# Patient Record
Sex: FEMALE | Race: BLACK OR AFRICAN AMERICAN | Employment: PART TIME | URBAN - METROPOLITAN AREA
[De-identification: names, ages, dates, MRNs, and addresses within clinical notes are randomized per-mention and may not be internally consistent; named-entity substitution may affect disease eponyms.]

---

## 2017-05-16 ENCOUNTER — HOSPITAL ENCOUNTER (EMERGENCY)
Age: 52
Discharge: HOME | End: 2017-05-16
Attending: EMERGENCY MEDICINE | Admitting: EMERGENCY MEDICINE
Payer: SELF-PAY

## 2017-05-16 ENCOUNTER — APPOINTMENT (OUTPATIENT)
Dept: GENERAL RADIOLOGY | Age: 52
End: 2017-05-16
Attending: EMERGENCY MEDICINE
Payer: SELF-PAY

## 2017-05-16 VITALS
HEIGHT: 67 IN | HEART RATE: 81 BPM | SYSTOLIC BLOOD PRESSURE: 125 MMHG | DIASTOLIC BLOOD PRESSURE: 55 MMHG | WEIGHT: 240 LBS | OXYGEN SATURATION: 97 % | RESPIRATION RATE: 16 BRPM | BODY MASS INDEX: 37.67 KG/M2 | TEMPERATURE: 98.1 F

## 2017-05-16 DIAGNOSIS — L23.7 POISON IVY DERMATITIS: Primary | ICD-10-CM

## 2017-05-16 DIAGNOSIS — S60.221A CONTUSION OF RIGHT HAND, INITIAL ENCOUNTER: ICD-10-CM

## 2017-05-16 PROCEDURE — 99282 EMERGENCY DEPT VISIT SF MDM: CPT

## 2017-05-16 PROCEDURE — 73130 X-RAY EXAM OF HAND: CPT

## 2017-05-16 RX ORDER — TRAZODONE HYDROCHLORIDE 150 MG/1
150 TABLET ORAL
COMMUNITY
End: 2020-07-06

## 2017-05-16 RX ORDER — FLUOXETINE HYDROCHLORIDE 20 MG/1
20 CAPSULE ORAL DAILY
COMMUNITY

## 2017-05-16 RX ORDER — IBUPROFEN 600 MG/1
600 TABLET ORAL
Qty: 20 TAB | Refills: 0 | Status: SHIPPED | OUTPATIENT
Start: 2017-05-16 | End: 2019-08-01

## 2017-05-16 RX ORDER — CALAMINE/ZINC OXIDE
LOTION (ML) TOPICAL
Qty: 1 BOTTLE | Refills: 0 | Status: SHIPPED | OUTPATIENT
Start: 2017-05-16 | End: 2019-08-01

## 2017-05-16 RX ORDER — TRIAMCINOLONE ACETONIDE 1 MG/ML
LOTION TOPICAL 3 TIMES DAILY
Qty: 60 ML | Refills: 0 | Status: SHIPPED | OUTPATIENT
Start: 2017-05-16 | End: 2019-08-01

## 2017-05-16 NOTE — ED PROVIDER NOTES
Patient is a 46 y.o. female presenting with hand pain and poison ivy. The history is provided by the patient. Hand Pain    This is a new (Rt ahnd pain after getting it jammed between a couch and a wall 2 weeks ago.) problem. The current episode started more than 1 week ago. The problem occurs constantly. The problem has not changed since onset. The pain is present in the right hand. The pain is at a severity of 7/10. Associated symptoms include itching. Pertinent negatives include no numbness, full range of motion, no stiffness, no tingling, no back pain and no neck pain. She has tried nothing for the symptoms. There has been a history of trauma. Poison Ivy/Poison Oak/Poison Sumac Exposure    This is a new problem. The current episode started more than 2 days ago. The problem has been gradually worsening. There has been no fever. The rash is present on the neck, back, left arm, right arm and right hand. The patient is experiencing no pain. Associated symptoms include itching. She has tried anti-itch cream for the symptoms. The treatment provided mild relief. Past Medical History:   Diagnosis Date    Arthritis     Tobacco abuse 10/22/2014       Past Surgical History:   Procedure Laterality Date    HX CHOLECYSTECTOMY  1992-[facility]         Family History:   Problem Relation Age of Onset    Hypertension Mother     Alcohol abuse Father        Social History     Social History    Marital status: SINGLE     Spouse name: N/A    Number of children: N/A    Years of education: N/A     Occupational History    Not on file. Social History Main Topics    Smoking status: Current Some Day Smoker    Smokeless tobacco: Never Used    Alcohol use No    Drug use: No    Sexual activity: Yes     Partners: Male     Birth control/ protection: None     Other Topics Concern    Not on file     Social History Narrative    10/22/14 Lives with boyfriend. Has GED. Reads well.   Not working, used to do cooking/car /HVAC. ALLERGIES: Review of patient's allergies indicates no known allergies. Review of Systems   Constitutional: Negative. Negative for activity change, chills, fatigue and fever. HENT: Negative. Negative for trouble swallowing. Eyes: Negative. Negative for pain. Respiratory: Negative. Negative for cough, chest tightness, shortness of breath and wheezing. Cardiovascular: Negative. Negative for chest pain, palpitations and leg swelling. Gastrointestinal: Negative. Negative for abdominal pain, diarrhea, nausea and vomiting. Genitourinary: Negative. Negative for difficulty urinating and dysuria. Musculoskeletal: Positive for arthralgias. Negative for back pain, joint swelling, neck pain and stiffness. Skin: Positive for itching and rash. Negative for wound. Neurological: Negative. Negative for tingling, numbness and headaches. Psychiatric/Behavioral: Negative. Vitals:    05/16/17 1200   BP: 125/55   Pulse: 81   Resp: 16   Temp: 98.1 °F (36.7 °C)   SpO2: 97%   Weight: 108.9 kg (240 lb)   Height: 5' 7\" (1.702 m)            Physical Exam   Constitutional: She is oriented to person, place, and time. She appears well-developed and well-nourished. No distress. HENT:   Head: Normocephalic and atraumatic. Right Ear: External ear normal.   Left Ear: External ear normal.   Nose: Nose normal.   Mouth/Throat: Oropharynx is clear and moist. No oropharyngeal exudate. Eyes: Conjunctivae and EOM are normal. Pupils are equal, round, and reactive to light. Neck: Normal range of motion. Neck supple. Cardiovascular: Normal rate, regular rhythm, normal heart sounds and intact distal pulses. Pulmonary/Chest: Effort normal and breath sounds normal.   Abdominal: Soft. Bowel sounds are normal. There is no tenderness. Musculoskeletal: Normal range of motion. She exhibits tenderness. She exhibits no edema or deformity.         Right wrist: Normal.        Right hand: She exhibits tenderness. She exhibits normal range of motion, no bony tenderness, normal two-point discrimination, normal capillary refill, no deformity, no laceration and no swelling. Normal sensation noted. Normal strength noted. Left hand: Normal.   Neurological: She is alert and oriented to person, place, and time. No cranial nerve deficit. Skin: Skin is warm and dry. Rash (generalized macular and nodular rash dispersed over bilateral hands, arms, lower back, and neck. Claamine lotion applied.) noted. She is not diaphoretic. Psychiatric: She has a normal mood and affect. Her behavior is normal. Judgment and thought content normal.   Nursing note and vitals reviewed. MDM  Number of Diagnoses or Management Options  Contusion of right hand, initial encounter:   Poison ivy dermatitis:   Diagnosis management comments: DDx: irritant contact derm, allergic contact derm, eczema, tinea  Sprain, contusion, fx, dislocation, arthritis    LABORATORY TESTS:  No results found for this or any previous visit (from the past 12 hour(s)). IMAGING RESULTS:  XR HAND RT MIN 3 V   Final Result       MEDICATIONS GIVEN:  Medications - No data to display    IMPRESSION:  Poison ivy dermatitis  (primary encounter diagnosis)  Contusion of right hand, initial encounter    PLAN:  1. Current Discharge Medication List    START taking these medications    ibuprofen (MOTRIN) 600 mg tablet  Take 1 Tab by mouth every six (6) hours as needed for Pain. Qty: 20 Tab Refills: 0    calamine-zinc oxide (CALAMINE) topical lotion  Apply  to affected area two (2) times daily as needed for Itching. Qty: 1 Bottle Refills: 0    triamcinolone (KENALOG) 0.1 % lotion  Apply  to affected area three (3) times daily. use thin layer  Qty: 60 mL Refills: 0      CONTINUE these medications which have NOT CHANGED    FLUoxetine (PROZAC) 20 mg capsule  Take 20 mg by mouth daily.     traZODone (DESYREL) 150 mg tablet  Take 150 mg by mouth nightly. hydrOXYzine (VISTARIL) 100 mg capsule  Take 1 Cap by mouth three (3) times daily as needed for Itching. Qty: 20 Cap Refills: 0    triamcinolone (ARISTOCORT) 0.5 % topical cream  Apply  to affected area two (2) times a day. Use thin layer. Do not use until Oral Prednisone has been finished. Qty: 15 g Refills: 0        2. Follow-up Information     Follow up With Details Comments [address], [place] Schedule an appointment as soon as possible for a   visit in 1 week As needed, If symptoms worsen [address] [place] [address]  [phone]        Return to ED if worse                  Amount and/or Complexity of Data Reviewed  Tests in the radiology section of CPT®: ordered and reviewed    Patient Progress  Patient progress: stable    ED Course       Procedures    1:11 PM  I have discussed with patient their diagnosis, treatment, and follow up plan. The patient agrees to follow up as outlined in discharge paperwork and also to return to the ED with any worsening.  Kenn Saha PA-C

## 2017-05-16 NOTE — ED NOTES
Emergency Department Nursing Plan of Care       The Nursing Plan of Care is developed from the Nursing assessment and Emergency Department Attending provider initial evaluation. The plan of care may be reviewed in the ED Provider note. The Plan of Care was developed with the following considerations:   Patient / Family readiness to learn indicated by:verbalized understanding  Persons(s) to be included in education: patient  Barriers to Learning/Limitations:No    Signed     Harl Bottom    5/16/2017   12:25 PM    See nursing assessment    Patient is alert and oriented x 4 and in no acute distress at this time. Respirations are at a regular rate, depth, and pattern. Patient updated on plan of care and has no questions or concerns at this time.

## 2017-05-16 NOTE — DISCHARGE INSTRUCTIONS
Poison [place], [place], and Sumac: Care Instructions  Your Care Instructions    Poison ivy, poison oak, and poison sumac are plants that can cause a skin rash upon contact. The red, itchy rash often shows up in lines or streaks and may cause fluid-filled blisters or large, raised hives. The rash is caused by an allergic reaction to an oil in poison ivy, oak, and sumac. The rash may occur when you touch the plant or when you touch clothing, pet fur, sporting gear, gardening tools, or other objects that have come in contact with one of these plants. You cannot catch or spread the rash, even if you touch it or the blister fluid, because the plant oil will already have been absorbed or washed off the skin. The rash may seem to be spreading, but either it is still developing from earlier contact or you have touched something that still has the plant oil on it. Follow-up care is a key part of your treatment and safety. Be sure to make and go to all appointments, and call your doctor if you are having problems. It's also a good idea to know your test results and keep a list of the medicines you take. How can you care for yourself at home? · If your doctor prescribed a cream, use it as directed. If your doctor prescribed medicine, take it exactly as prescribed. Call your doctor if you think you are having a problem with your medicine. · Use cold, wet cloths to reduce itching. · Keep cool, and stay out of the sun. · Leave the rash open to the air. · Wash all clothing or other things that may have come in contact with the plant oil. · Avoid most lotions and ointments until the rash heals. Calamine lotion may help relieve symptoms of a plant rash. Use it 3 or 4 times a day. To prevent poison ivy exposure  If you know that you will be near poison ivy, oak, or sumac, you can try these options:  · Use a product designed to help prevent plant oil from getting on the skin.  These products, such as Ivy X Pre-Contact Skin Solution, come in lotions, sprays, or towelettes. You put the product on your skin right before you go outdoors. · If you did not use a preventive product and you have had contact with plant oil, clean it off your skin as soon as possible. Use a product such as [employer]ec[employer] Original Outdoor Skin Cleanser. These products can also be used to clean plant oil from clothing or tools. When should you call for help? Call your doctor now or seek immediate medical care if:  · Your rash gets worse, and you start to feel bad and have a fever, a stiff neck, nausea, and vomiting. · You have signs of infection, such as:  ¨ Increased pain, swelling, warmth, or redness. ¨ Red streaks leading from the rash. ¨ Pus draining from the rash. ¨ A fever. Watch closely for changes in your health, and be sure to contact your doctor if:  · You have new blisters or bruises, or the rash spreads and looks like a sunburn. · The rash gets worse, or it comes back after nearly disappearing. · You think a medicine you are using is making your rash worse. · Your rash does not clear up after 1 to 2 weeks of home treatment. · You have joint aches or body aches with your rash. Where can you learn more? Go to http://willian-linus.info/. Enter M728 in the search box to learn more about \"Poison [employer], [place], and [place]ac: Care Instructions. \"  Current as of: October 13, 2016  Content Version: 11.2  © 2829-6698 [employer]. Care instructions adapted under license by [employer] (which disclaims liability or warranty for this information). If you have questions about a medical condition or this instruction, always ask your healthcare professional. [place] [address] any warranty or liability for your use of this information. Hand Bruises: Care Instructions  Your Care Instructions  Bruises, or contusions, can happen as a result of an impact or fall.  Most people think of a bruise as a black-and-blue spot. This happens when small blood vessels get torn and leak blood under the skin. The bruise may turn purplish black, reddish blue, or yellowish green as it heals. But bones and muscles can also get bruised. This may damage the hand but not cause a bruise that you can see. Most bruises aren't serious and will go away on their own in 2 to 4 weeks. But sometimes a more serious hand injury might not heal on its own. Tell your doctor if you have new symptoms or your injury is not getting better over time. You may have tests to see if you have bone or nerve damage. These tests may include X-rays, a CT scan, or an MRI. If you damaged bones or muscles, you may need more treatment. The doctor has checked you carefully, but problems can develop later. If you notice any problems or new symptoms, get medical treatment right away. Follow-up care is a key part of your treatment and safety. Be sure to make and go to all appointments, and call your doctor if you are having problems. It's also a good idea to know your test results and keep a list of the medicines you take. How can you care for yourself at home? · Put ice or a cold pack on the hand for 10 to 20 minutes at a time. Put a thin cloth between the ice and your skin. · Prop up your hand on a pillow when you ice it or anytime you sit or lie down during the next 3 days. Try to keep your hand above the level of your heart. This will help reduce swelling. · Be safe with medicines. Read and follow all instructions on the label. ¨ If the doctor gave you a prescription medicine for pain, take it as prescribed. ¨ If you are not taking a prescription pain medicine, ask your doctor if you can take an over-the-counter medicine. · Be sure to follow your doctor's advice about moving and exercising your injured hand. When should you call for help? Call your doctor now or seek immediate medical care if:  · Your pain gets worse.   · You have new or worse swelling. · You have tingling, weakness, or numbness in the area near the bruise. · The area near the bruise is cold or pale. · You have symptoms of infection, such as:  ¨ Increased pain, swelling, warmth, or redness. ¨ Red streaks leading from the area. ¨ Pus draining from the area. ¨ A fever. Watch closely for changes in your health, and be sure to contact your doctor if:  · You do not get better as expected. Where can you learn more? Go to http://willian-linus.info/. Enter X898 in the search box to learn more about \"Hand Bruises: Care Instructions. \"  Current as of: May 27, 2016  Content Version: 11.2  © 9733-3080 [employer]. Care instructions adapted under license by [employer] (which disclaims liability or warranty for this information). If you have questions about a medical condition or this instruction, always ask your healthcare professional. [place] [address] any warranty or liability for your use of this information.

## 2019-08-01 ENCOUNTER — HOSPITAL ENCOUNTER (EMERGENCY)
Age: 54
Discharge: HOME | End: 2019-08-01
Attending: EMERGENCY MEDICINE
Payer: MEDICAID

## 2019-08-01 VITALS
OXYGEN SATURATION: 95 % | SYSTOLIC BLOOD PRESSURE: 123 MMHG | BODY MASS INDEX: 37.03 KG/M2 | RESPIRATION RATE: 16 BRPM | TEMPERATURE: 98.9 F | HEIGHT: 68 IN | DIASTOLIC BLOOD PRESSURE: 95 MMHG | HEART RATE: 96 BPM

## 2019-08-01 DIAGNOSIS — L23.7 ALLERGIC CONTACT DERMATITIS DUE TO PLANTS, EXCEPT FOOD: Primary | ICD-10-CM

## 2019-08-01 PROCEDURE — 96372 THER/PROPH/DIAG INJ SC/IM: CPT

## 2019-08-01 PROCEDURE — 99283 EMERGENCY DEPT VISIT LOW MDM: CPT

## 2019-08-01 PROCEDURE — 74011250636 HC RX REV CODE- 250/636: Performed by: NURSE PRACTITIONER

## 2019-08-01 PROCEDURE — 74011250637 HC RX REV CODE- 250/637: Performed by: NURSE PRACTITIONER

## 2019-08-01 RX ORDER — FAMOTIDINE 40 MG/1
40 TABLET, FILM COATED ORAL DAILY
Qty: 5 TAB | Refills: 0 | Status: SHIPPED | OUTPATIENT
Start: 2019-08-01 | End: 2019-08-06

## 2019-08-01 RX ORDER — FAMOTIDINE 20 MG/1
40 TABLET, FILM COATED ORAL
Status: COMPLETED | OUTPATIENT
Start: 2019-08-01 | End: 2019-08-01

## 2019-08-01 RX ORDER — CETIRIZINE HCL 10 MG
10 TABLET ORAL DAILY
Qty: 5 TAB | Refills: 0 | Status: SHIPPED | OUTPATIENT
Start: 2019-08-01 | End: 2019-08-06

## 2019-08-01 RX ORDER — DIPHENHYDRAMINE HCL 25 MG
25 CAPSULE ORAL
Status: COMPLETED | OUTPATIENT
Start: 2019-08-01 | End: 2019-08-01

## 2019-08-01 RX ORDER — DEXAMETHASONE SODIUM PHOSPHATE 100 MG/10ML
10 INJECTION INTRAMUSCULAR; INTRAVENOUS ONCE
Status: COMPLETED | OUTPATIENT
Start: 2019-08-01 | End: 2019-08-01

## 2019-08-01 RX ORDER — PREDNISONE 20 MG/1
20 TABLET ORAL DAILY
Qty: 5 TAB | Refills: 0 | Status: SHIPPED | OUTPATIENT
Start: 2019-08-01 | End: 2019-08-06

## 2019-08-01 RX ADMIN — FAMOTIDINE 40 MG: 20 TABLET ORAL at 20:15

## 2019-08-01 RX ADMIN — DEXAMETHASONE SODIUM PHOSPHATE 10 MG: 10 INJECTION INTRAMUSCULAR; INTRAVENOUS at 20:13

## 2019-08-01 RX ADMIN — DIPHENHYDRAMINE HYDROCHLORIDE 25 MG: 25 CAPSULE ORAL at 20:15

## 2019-08-01 NOTE — LETTER
[facility] EMERGENCY DEPT 
[address] [address] 
[phone] Work/School Note Date: 8/1/2019 To Whom It May concern: 
 
Dolly Mcdowell was seen and treated today in the emergency room by the following provider(s): 
Attending Provider: Sabrina Romano MD 
Nurse Practitioner: Tim Boateng NP. Dolly Mcdowell may return to work on 8/4/19. Sincerely, Trudy Vasquez NP

## 2019-08-01 NOTE — ED TRIAGE NOTES
Pt reports itching and hives X yesterday after doing yard work. Pt is unknown what caused her reaction. Pt denies SOB or throat closing.

## 2019-08-01 NOTE — ED PROVIDER NOTES
EMERGENCY DEPARTMENT HISTORY AND PHYSICAL EXAM    Date: 8/1/2019  Patient Name: Kishore Iglesias    History of Presenting Illness     Chief Complaint   Patient presents with    Allergic Reaction         History Provided By: Patient    HPI: Kishore Iglesias is a 52 y.o. female with a PMH of No significant past medical history who presents with allergic reaction. Pt is a . States she was in the plants yesterday. Unsure if any were poison ivy. Initially started with rash to arms but has spread to chest, neck, and abdomen. Has not tried anything for sx. States today the itching was too intense and she came to the ER. Denies CP, SOB, throat scratching or itching. PCP: Ana Gupta MD    Current Outpatient Medications   Medication Sig Dispense Refill    famotidine (PEPCID) 40 mg tablet Take 1 Tab by mouth daily for 5 doses. 5 Tab 0    predniSONE (DELTASONE) 20 mg tablet Take 20 mg by mouth daily for 5 days. With Breakfast 5 Tab 0    cetirizine (ZYRTEC) 10 mg tablet Take 1 Tab by mouth daily for 5 days. 5 Tab 0    FLUoxetine (PROZAC) 20 mg capsule Take 20 mg by mouth daily.  traZODone (DESYREL) 150 mg tablet Take 150 mg by mouth nightly. Past History     Past Medical History:  Past Medical History:   Diagnosis Date    Arthritis     Tobacco abuse 10/22/2014       Past Surgical History:  Past Surgical History:   Procedure Laterality Date    HX CHOLECYSTECTOMY  1992-[facility]       Family History:  Family History   Problem Relation Age of Onset    Hypertension Mother     Alcohol abuse Father        Social History:  Social History     Tobacco Use    Smoking status: Current Some Day Smoker    Smokeless tobacco: Never Used   Substance Use Topics    Alcohol use: No    Drug use: No       Allergies: Allergies   Allergen Reactions    Bee Venom Protein (Honey Bee) Shortness of Breath         Review of Systems   Review of Systems   Constitutional: Negative for chills and fever.    HENT: Negative for congestion and rhinorrhea. Eyes: Negative for pain and itching. Respiratory: Negative for cough and shortness of breath. Cardiovascular: Negative for palpitations and leg swelling. Gastrointestinal: Negative for abdominal pain, nausea and vomiting. Musculoskeletal: Negative for arthralgias and joint swelling. Skin: Positive for rash. Negative for color change. No drainage, redness, swelling, warmth   Neurological: Negative for headaches. All other systems reviewed and are negative. Physical Exam     Vitals:    08/01/19 1950   BP: (!) 123/95   Pulse: 96   Resp: 16   Temp: 98.9 °F (37.2 °C)   SpO2: 95%   Height: 5' 7.5\" (1.715 m)     Physical Exam   Constitutional: She is oriented to person, place, and time. She appears well-developed and well-nourished. HENT:   Head: Normocephalic and atraumatic. Mouth/Throat: No uvula swelling. No oropharyngeal exudate, posterior oropharyngeal edema or posterior oropharyngeal erythema. Eyes: Pupils are equal, round, and reactive to light. Conjunctivae and EOM are normal.   Neck: Normal range of motion. Neck supple. Cardiovascular: Normal rate, regular rhythm and normal heart sounds. Pulmonary/Chest: Effort normal and breath sounds normal.   Neurological: She is alert and oriented to person, place, and time. Skin: Rash noted. Rash is papular (diffuse to BUE neck, chest and abd ). Nursing note and vitals reviewed. Diagnostic Study Results     Labs -   No results found for this or any previous visit (from the past 12 hour(s)). Radiologic Studies -   No orders to display     CT Results  (Last 48 hours)    None        CXR Results  (Last 48 hours)    None            Medical Decision Making   I am the first provider for this patient. I reviewed the vital signs, available nursing notes, past medical history, past surgical history, family history and social history.     Vital Signs-Reviewed the patient's vital signs. Records Reviewed: Nursing Notes and Old Medical Records            Disposition:  Discharge     DISCHARGE NOTE:   8:17 PM      Care plan outlined and precautions discussed. Patient has no new complaints, changes, or physical findings. All medications were reviewed with the patient; will d/c home with zyrtec, prednisone and pepcid. All of pt's questions and concerns were addressed. Patient was instructed and agrees to follow up with PCp, as well as to return to the ED upon further deterioration. Patient is ready to go home. Follow-up Information     Follow up With Specialties Details Why Contact Info    Sayra Huang MD Internal Medicine, Internal Medicine Call If symptoms worsen [address] [facility]  [phone]            Current Discharge Medication List      START taking these medications    Details   famotidine (PEPCID) 40 mg tablet Take 1 Tab by mouth daily for 5 doses. Qty: 5 Tab, Refills: 0      predniSONE (DELTASONE) 20 mg tablet Take 20 mg by mouth daily for 5 days. With Breakfast  Qty: 5 Tab, Refills: 0      cetirizine (ZYRTEC) 10 mg tablet Take 1 Tab by mouth daily for 5 days. Qty: 5 Tab, Refills: 0         CONTINUE these medications which have NOT CHANGED    Details   FLUoxetine (PROZAC) 20 mg capsule Take 20 mg by mouth daily. traZODone (DESYREL) 150 mg tablet Take 150 mg by mouth nightly. STOP taking these medications       ibuprofen (MOTRIN) 600 mg tablet Comments:   Reason for Stopping:         calamine-zinc oxide (CALAMINE) topical lotion Comments:   Reason for Stopping:         hydrOXYzine (VISTARIL) 100 mg capsule Comments:   Reason for Stopping:               Provider Notes (Medical Decision Making):   DDX: contact dermatitis, poison ivy, eczema     Procedures:  Procedures    Please note that this dictation was completed with Dragon, computer voice recognition software.   Quite often unanticipated grammatical, syntax, homophones, and other interpretive errors are inadvertently transcribed by the computer software. Please disregard these errors. Additionally, please excuse any errors that have escaped final proofreading. Diagnosis     Clinical Impression:   1.  Allergic contact dermatitis due to plants, except food

## 2019-08-02 NOTE — DISCHARGE INSTRUCTIONS
Patient Education        Poison ABBEY-JASMINN, [place], and [place]: Care Instructions  Your Care Instructions    Poison ivy, poison oak, and poison sumac are plants that can cause a skin rash upon contact. The red, itchy rash often shows up in lines or streaks and may cause fluid-filled blisters or large, raised hives. The rash is caused by an allergic reaction to an oil in poison ivy, oak, and sumac. The rash may occur when you touch the plant or when you touch clothing, pet fur, sporting gear, gardening tools, or other objects that have come in contact with one of these plants. You cannot catch or spread the rash, even if you touch it or the blister fluid, because the plant oil will already have been absorbed or washed off the skin. The rash may seem to be spreading, but either it is still developing from earlier contact or you have touched something that still has the plant oil on it. Follow-up care is a key part of your treatment and safety. Be sure to make and go to all appointments, and call your doctor if you are having problems. It's also a good idea to know your test results and keep a list of the medicines you take. How can you care for yourself at home? · If your doctor prescribed a cream, use it as directed. If your doctor prescribed medicine, take it exactly as prescribed. Call your doctor if you think you are having a problem with your medicine. · Use cold, wet cloths to reduce itching. · Keep cool, and stay out of the sun. · Leave the rash open to the air. · Wash all clothing or other things that may have come in contact with the plant oil. · Avoid most lotions and ointments until the rash heals. Calamine lotion may help relieve symptoms of a plant rash. Use it 3 or 4 times a day. To prevent poison ivy exposure  If you know that you will be near poison ivy, oak, or sumac, you can try these options:  · Use a product designed to help prevent plant oil from getting on the skin.  These products, such as Ivy X Pre-Contact Skin Solution, come in lotions, sprays, or towelettes. You put the product on your skin right before you go outdoors. · If you did not use a preventive product and you have had contact with plant oil, clean it off your skin as soon as possible. Use a product such as Tecnu Original Outdoor Skin Cleanser. These products can also be used to clean plant oil from clothing or tools. When should you call for help? Call your doctor now or seek immediate medical care if:    · Your rash gets worse, and you start to feel bad and have a fever, a stiff neck, nausea, and vomiting.     · You have signs of infection, such as:  ? Increased pain, swelling, warmth, or redness. ? Red streaks leading from the rash. ? Pus draining from the rash. ? A fever.    Watch closely for changes in your health, and be sure to contact your doctor if:    · You have new blisters or bruises, or the rash spreads and looks like a sunburn.     · The rash gets worse, or it comes back after nearly disappearing.     · You think a medicine you are using is making your rash worse.     · Your rash does not clear up after 1 to 2 weeks of home treatment.     · You have joint aches or body aches with your rash. Where can you learn more? Go to http://willian-linus.info/. Enter W492 in the search box to learn more about \"Poison ABBEY-EMILY, [place], and [place]: Care Instructions. \"  Current as of: April 1, 2019  Content Version: 12.1  © 9703-2105 Healthwise, Incorporated. Care instructions adapted under license by [employer] (which disclaims liability or warranty for this information). If you have questions about a medical condition or this instruction, always ask your healthcare professional. [address] any warranty or liability for your use of this information.

## 2019-08-02 NOTE — ED NOTES
Discharge instructions were given to the patient by [address], RN. The patient left the Emergency Department ambulatory, alert and oriented and in no acute distress with 3 prescriptions. The patient was encouraged to call or return to the ED for worsening issues or problems and was encouraged to schedule a follow up appointment for continuing care. The patient verbalized understanding of discharge instructions and prescriptions, all questions were answered. The patient has no further concerns at this time.

## 2019-08-02 NOTE — ED NOTES
Pt presents ambulatory to ED complaining of rash (bumps), redness to neck, BUE and anterior trunk after doing yard work yesterday. Pt reports taking benadryl, Cortizone cream, and ibuprofen with no relief of symtoms. Pt is alert and oriented x 4, RR even and unlabored, skin is warm and dry. Assesment completed and pt updated on plan of care. Emergency Department Nursing Plan of Care       The Nursing Plan of Care is developed from the Nursing assessment and Emergency Department Attending provider initial evaluation. The plan of care may be reviewed in the ED Provider note.     The Plan of Care was developed with the following considerations:   Patient / Family readiness to learn indicated by:verbalized understanding  Persons(s) to be included in education: patient  Barriers to Learning/Limitations:No    Signed     [address], RN    8/1/2019   8:03 PM

## 2019-08-12 ENCOUNTER — HOSPITAL ENCOUNTER (EMERGENCY)
Age: 54
Discharge: HOME | End: 2019-08-12
Attending: EMERGENCY MEDICINE
Payer: MEDICAID

## 2019-08-12 VITALS
RESPIRATION RATE: 16 BRPM | TEMPERATURE: 97.9 F | SYSTOLIC BLOOD PRESSURE: 135 MMHG | HEART RATE: 82 BPM | OXYGEN SATURATION: 96 % | DIASTOLIC BLOOD PRESSURE: 76 MMHG | HEIGHT: 67 IN | WEIGHT: 225 LBS | BODY MASS INDEX: 35.31 KG/M2

## 2019-08-12 DIAGNOSIS — B37.31 VAGINAL CANDIDIASIS: Primary | ICD-10-CM

## 2019-08-12 DIAGNOSIS — E11.65 UNCONTROLLED TYPE 2 DIABETES MELLITUS WITH HYPERGLYCEMIA (HCC): ICD-10-CM

## 2019-08-12 LAB
APPEARANCE UR: CLEAR
BACTERIA URNS QL MICRO: NEGATIVE /HPF
BILIRUB UR QL: NEGATIVE
CLUE CELLS VAG QL WET PREP: NORMAL
COLOR UR: ABNORMAL
EPITH CASTS URNS QL MICRO: ABNORMAL /LPF
GLUCOSE BLD STRIP.AUTO-MCNC: 341 MG/DL (ref 65–100)
GLUCOSE UR STRIP.AUTO-MCNC: >1000 MG/DL
HGB UR QL STRIP: NEGATIVE
KETONES UR QL STRIP.AUTO: NEGATIVE MG/DL
KOH PREP SPEC: NORMAL
LEUKOCYTE ESTERASE UR QL STRIP.AUTO: NEGATIVE
NITRITE UR QL STRIP.AUTO: NEGATIVE
PH UR STRIP: 5.5 [PH] (ref 5–8)
PROT UR STRIP-MCNC: NEGATIVE MG/DL
RBC #/AREA URNS HPF: ABNORMAL /HPF (ref 0–5)
SERVICE CMNT-IMP: ABNORMAL
SERVICE CMNT-IMP: NORMAL
SP GR UR REFRACTOMETRY: 1.01 (ref 1–1.03)
T VAGINALIS VAG QL WET PREP: NORMAL
UA: UC IF INDICATED,UAUC: ABNORMAL
UROBILINOGEN UR QL STRIP.AUTO: 0.2 EU/DL (ref 0.2–1)
WBC URNS QL MICRO: ABNORMAL /HPF (ref 0–4)
YEAST URNS QL MICRO: PRESENT

## 2019-08-12 PROCEDURE — 87210 SMEAR WET MOUNT SALINE/INK: CPT

## 2019-08-12 PROCEDURE — 99283 EMERGENCY DEPT VISIT LOW MDM: CPT

## 2019-08-12 PROCEDURE — 81001 URINALYSIS AUTO W/SCOPE: CPT

## 2019-08-12 PROCEDURE — 82962 GLUCOSE BLOOD TEST: CPT

## 2019-08-12 PROCEDURE — 87491 CHLMYD TRACH DNA AMP PROBE: CPT

## 2019-08-12 PROCEDURE — 74011250637 HC RX REV CODE- 250/637: Performed by: PHYSICIAN ASSISTANT

## 2019-08-12 RX ORDER — FLUCONAZOLE 150 MG/1
150 TABLET ORAL
Qty: 1 TAB | Refills: 0 | Status: SHIPPED | OUTPATIENT
Start: 2019-08-12 | End: 2019-08-12

## 2019-08-12 RX ORDER — HYDROXYZINE 50 MG/1
50 TABLET, FILM COATED ORAL
Qty: 20 TAB | Refills: 0 | Status: SHIPPED | OUTPATIENT
Start: 2019-08-12 | End: 2019-08-22

## 2019-08-12 RX ORDER — HYDROXYZINE 25 MG/1
50 TABLET, FILM COATED ORAL
Status: COMPLETED | OUTPATIENT
Start: 2019-08-12 | End: 2019-08-12

## 2019-08-12 RX ORDER — FLUCONAZOLE 100 MG/1
150 TABLET ORAL
Status: DISCONTINUED | OUTPATIENT
Start: 2019-08-12 | End: 2019-08-12

## 2019-08-12 RX ADMIN — HYDROXYZINE HYDROCHLORIDE 50 MG: 25 TABLET, FILM COATED ORAL at 18:24

## 2019-08-12 NOTE — DISCHARGE INSTRUCTIONS
Patient Education        Learning About High Blood Sugar  What is high blood sugar? Your body turns the food you eat into glucose (sugar), which it uses for energy. But if your body isn't able to use the sugar right away, it can build up in your blood and lead to high blood sugar. When the amount of sugar in your blood stays too high for too much of the time, you may have diabetes. Diabetes is a disease that can cause serious health problems. The good news is that lifestyle changes may help you get your blood sugar back to normal and avoid or delay diabetes. What causes high blood sugar? Sugar (glucose) can build up in your blood if you:  · Are overweight. · Have a family history of diabetes. · Take certain medicines, such as steroids. What are the symptoms? Having high blood sugar may not cause any symptoms at all. Or it may make you feel very thirsty or very hungry. You may also urinate more often than usual, have blurry vision, or lose weight without trying. How is high blood sugar treated? You can take steps to lower your blood sugar level if you understand what makes it get higher. Your doctor may want you to learn how to test your blood sugar level at home. Then you can see how illness, stress, or different kinds of food or medicine raise or lower your blood sugar level. Other tests may be needed to see if you have diabetes. How can you prevent high blood sugar? · Watch your weight. If you're overweight, losing just a small amount of weight may help. Reducing fat around your waist is most important. · Limit the amount of calories, sweets, and unhealthy fat you eat. Ask your doctor if a dietitian can help you. A registered dietitian can help you create meal plans that fit your lifestyle. · Get at least 30 minutes of exercise on most days of the week. Exercise helps control your blood sugar. It also helps you maintain a healthy weight. Walking is a good choice.  You also may want to do other activities, such as running, swimming, cycling, or playing tennis or team sports. · If your doctor prescribed medicines, take them exactly as prescribed. Call your doctor if you think you are having a problem with your medicine. You will get more details on the specific medicines your doctor prescribes. Follow-up care is a key part of your treatment and safety. Be sure to make and go to all appointments, and call your doctor if you are having problems. It's also a good idea to know your test results and keep a list of the medicines you take. Where can you learn more? Go to http://willian-linus.info/. Enter O108 in the search box to learn more about \"Learning About High Blood Sugar. \"  Current as of: July 25, 2018  Content Version: 12.1  © 5582-6026 Healthwise, Incorporated. Care instructions adapted under license by [employer] (which disclaims liability or warranty for this information). If you have questions about a medical condition or this instruction, always ask your healthcare professional. [address] any warranty or liability for your use of this information.

## 2019-08-12 NOTE — ED TRIAGE NOTES
Reports seen here recently for skin rash (poison ivy/oak) still with itch and rash. Also reports has started with vaginal itching.

## 2019-08-12 NOTE — ED NOTES
Karen Monroe at bedside reviewing patient's discharge instructions and reviewing medications. Patient ambulatory home . Patient in no apparent distress.

## 2019-08-12 NOTE — ED NOTES
Pt arrived to ED with c/o itching x being exposed to poison ivy/oak and vag itching x 3 days. Pt is in no acute distress. Will continue to monitor. See nursing assessment. Safety precautions in place; call light within reach. Emergency Department Nursing Plan of Care       The Nursing Plan of Care is developed from the Nursing assessment and Emergency Department Attending provider initial evaluation. The plan of care may be reviewed in the ED Provider note.     The Plan of Care was developed with the following considerations:   Patient / Family readiness to learn indicated by:verbalized understanding  Persons(s) to be included in education: patient  Barriers to Learning/Limitations:No    Signed     Aleah Mcmahon RN    8/12/2019   6:39 PM

## 2019-08-12 NOTE — ED PROVIDER NOTES
EMERGENCY DEPARTMENT HISTORY AND PHYSICAL EXAM      Date: 8/12/2019  Patient Name: Linda Rick    History of Presenting Illness     Chief Complaint   Patient presents with    Skin Problem    Vaginal Itching       History Provided By: Patient    HPI: Linda Rick, 52 y.o. female with PMHx significant for arthritis, tobacco abuse, cholecsytectomy, presents ambulatory to the ED with cc of subacute moderate generalized pruritis x 12 days secondary to exposure to possible poisonous plant on 7/31/2019. Patient was seen and evaluated in [facility] ED on 8/1/2019 for same symptoms. Prescribed cetirizine, famotidine, prednisone with minimal relief. States she ran out of her prescriptions has not taken any medicine today. Additionally endorses she been having vaginal irritation, itching, discharge the last couple of days. No medications or modifying factors for the symptoms. Denies any recent antibiotic use. Endorses mild dysuria. Denies frequency, urgency, genital sore/ rash, cuts patient, diarrhea, abdominal pain, fever, chills, nausea, vomiting. There are no other complaints, changes, or physical findings at this time. PCP: Sayra Huang MD    No current facility-administered medications on file prior to encounter. Current Outpatient Medications on File Prior to Encounter   Medication Sig Dispense Refill    metformin HCl (METFORMIN PO) Take  by mouth.  FLUoxetine (PROZAC) 20 mg capsule Take 20 mg by mouth daily.  traZODone (DESYREL) 150 mg tablet Take 150 mg by mouth nightly.          Past History     Past Medical History:  Past Medical History:   Diagnosis Date    Arthritis     Diabetes ([address]     Tobacco abuse 10/22/2014       Past Surgical History:  Past Surgical History:   Procedure Laterality Date    HX CHOLECYSTECTOMY  1992-[facility]       Family History:  Family History   Problem Relation Age of Onset    Hypertension Mother     Alcohol abuse Father Social History:  Social History     Tobacco Use    Smoking status: Current Some Day Smoker    Smokeless tobacco: Never Used   Substance Use Topics    Alcohol use: No    Drug use: No       Allergies: Allergies   Allergen Reactions    Bee Venom Protein (Honey Bee) Shortness of Breath         Review of Systems   Review of Systems   Constitutional: Negative. Negative for activity change, chills, fatigue and fever. HENT: Negative. Eyes: Negative. Negative for pain. Respiratory: Negative. Negative for cough and shortness of breath. Cardiovascular: Negative. Negative for chest pain. Gastrointestinal: Negative. Negative for abdominal pain, diarrhea, nausea and vomiting. Genitourinary: Positive for dysuria and vaginal discharge. Negative for decreased urine volume, difficulty urinating, flank pain, frequency, genital sores, hematuria, pelvic pain, urgency and vaginal bleeding. Musculoskeletal: Negative. Negative for arthralgias and joint swelling. Skin: Positive for rash. Negative for wound. Neurological: Negative. Negative for headaches. Psychiatric/Behavioral: Negative. Physical Exam   Physical Exam   Constitutional: She is oriented to person, place, and time. She appears well-developed and well-nourished. No distress. HENT:   Head: Normocephalic and atraumatic. Right Ear: Hearing and external ear normal.   Left Ear: Hearing and external ear normal.   Nose: Nose normal.   Eyes: Pupils are equal, round, and reactive to light. Conjunctivae and EOM are normal.   Neck: Normal range of motion. Pulmonary/Chest: Effort normal. No respiratory distress. Musculoskeletal: Normal range of motion. Neurological: She is alert and oriented to person, place, and time. Skin: Skin is warm, dry and intact. No rash noted. She is not diaphoretic. Psychiatric: She has a normal mood and affect.  Her behavior is normal. Judgment and thought content normal.   Nursing note and vitals reviewed. Diagnostic Study Results     Labs -     Recent Results (from the past 12 hour(s))   WET PREP    Collection Time: 08/12/19  6:01 PM   Result Value Ref Range    Clue cells CLUE CELLS ABSENT      Wet prep NO TRICHOMONAS SEEN     KOH, OTHER SOURCES    Collection Time: 08/12/19  6:01 PM   Result Value Ref Range    Special Requests: NO SPECIAL REQUESTS      KOH FEW  YEAST       URINALYSIS W/ REFLEX CULTURE    Collection Time: 08/12/19  6:01 PM   Result Value Ref Range    Color YELLOW/STRAW      Appearance CLEAR CLEAR      Specific gravity 1.015 1.003 - 1.030      pH (UA) 5.5 5.0 - 8.0      Protein NEGATIVE  NEG mg/dL    Glucose >1,000 (A) NEG mg/dL    Ketone NEGATIVE  NEG mg/dL    Bilirubin NEGATIVE  NEG      Blood NEGATIVE  NEG      Urobilinogen 0.2 0.2 - 1.0 EU/dL    Nitrites NEGATIVE  NEG      Leukocyte Esterase NEGATIVE  NEG      WBC 0-4 0 - 4 /hpf    RBC 0-5 0 - 5 /hpf    Epithelial cells FEW FEW /lpf    Bacteria NEGATIVE  NEG /hpf    UA:UC IF INDICATED CULTURE NOT INDICATED BY UA RESULT CNI      Yeast PRESENT (A) NEG     GLUCOSE, POC    Collection Time: 08/12/19  6:34 PM   Result Value Ref Range    Glucose (POC) 341 (H) 65 - 100 mg/dL    Performed by Trice BRYAN        Radiologic Studies -   No orders to display     CT Results  (Last 48 hours)    None        CXR Results  (Last 48 hours)    None            Medical Decision Making   I am the first provider for this patient. I reviewed the vital signs, available nursing notes, past medical history, past surgical history, family history and social history. Vital Signs-Reviewed the patient's vital signs.   Patient Vitals for the past 12 hrs:   Temp Pulse Resp BP SpO2   08/12/19 1728 97.9 °F (36.6 °C) 82 16 135/76 96 %       Pulse Oximetry Analysis - 96% on RA    Records Reviewed: Nursing Notes, Old Medical Records, Previous Radiology Studies and Previous Laboratory Studies    Provider Notes (Medical Decision Making):   Patient presents with vaginal pruritis and discharge. DDx: Yeast infection, Chlamydia, gonorrhea, trichomonas, herpes, BV, UTI. Will get GC, UA, vaginal swabs. ED Course:   Initial assessment performed. The patients presenting problems have been discussed, and they are in agreement with the care plan formulated and outlined with them. I have encouraged them to ask questions as they arise throughout their visit. Educated patient extensively on blood glucose monitoring at home as well as taking medications as prescribed. Advised patient to follow-up with PCP within 1 week's time. Critical Care Time:   0    Disposition:  6:47 PM  I have discussed with patient their diagnosis, treatment, and follow up plan. The patient agrees to follow up as outlined in discharge paperwork and also to return to the ED with any worsening. Jane Cardona PA-C        PLAN:  1. Current Discharge Medication List      START taking these medications    Details   hydrOXYzine HCl (ATARAX) 50 mg tablet Take 1 Tab by mouth every six (6) hours as needed for Itching for up to 10 days. Qty: 20 Tab, Refills: 0      fluconazole (DIFLUCAN) 150 mg tablet Take 1 Tab by mouth now for 1 dose. FDA advises cautious prescribing of oral fluconazole in pregnancy. Qty: 1 Tab, Refills: 0         CONTINUE these medications which have NOT CHANGED    Details   metformin HCl (METFORMIN PO) Take  by mouth. FLUoxetine (PROZAC) 20 mg capsule Take 20 mg by mouth daily. traZODone (DESYREL) 150 mg tablet Take 150 mg by mouth nightly. 2.   Follow-up Information     Follow up With Specialties Details Why Contact Info    Sam De Jesus MD Internal Medicine, Internal Medicine Schedule an appointment as soon as possible for a visit in 3 days As needed [id number] [facility]  [phone]          Return to ED if worse     Diagnosis     Clinical Impression:   1. Vaginal candidiasis    2.  Uncontrolled type 2 diabetes mellitus with hyperglycemia ([facility] [address] Attestations:    Please note that this dictation was completed with Dragon, computer voice recognition software. Quite often unanticipated grammatical, syntax, homophones, and other interpretive errors are inadvertently transcribed by the computer software. Please disregard these errors. Additionally, please excuse any errors that have escaped final proofreading.

## 2019-08-14 LAB
C TRACH DNA SPEC QL NAA+PROBE: NEGATIVE
N GONORRHOEA DNA SPEC QL NAA+PROBE: NEGATIVE
SAMPLE TYPE: NORMAL
SERVICE CMNT-IMP: NORMAL
SPECIMEN SOURCE: NORMAL

## 2019-09-01 ENCOUNTER — HOSPITAL ENCOUNTER (EMERGENCY)
Age: 54
Discharge: HOME | End: 2019-09-01
Attending: EMERGENCY MEDICINE
Payer: MEDICAID

## 2019-09-01 VITALS
HEART RATE: 70 BPM | TEMPERATURE: 97.9 F | SYSTOLIC BLOOD PRESSURE: 142 MMHG | OXYGEN SATURATION: 97 % | HEIGHT: 67 IN | WEIGHT: 228 LBS | DIASTOLIC BLOOD PRESSURE: 57 MMHG | RESPIRATION RATE: 17 BRPM | BODY MASS INDEX: 35.79 KG/M2

## 2019-09-01 DIAGNOSIS — R10.2 SUPRAPUBIC ABDOMINAL PAIN: Primary | ICD-10-CM

## 2019-09-01 LAB
APPEARANCE UR: CLEAR
BACTERIA URNS QL MICRO: NEGATIVE /HPF
BILIRUB UR QL: NEGATIVE
CLUE CELLS VAG QL WET PREP: NORMAL
COLOR UR: ABNORMAL
EPITH CASTS URNS QL MICRO: ABNORMAL /LPF
GLUCOSE UR STRIP.AUTO-MCNC: >1000 MG/DL
HCG UR QL: NEGATIVE
HGB UR QL STRIP: NEGATIVE
KETONES UR QL STRIP.AUTO: NEGATIVE MG/DL
KOH PREP SPEC: NORMAL
LEUKOCYTE ESTERASE UR QL STRIP.AUTO: NEGATIVE
NITRITE UR QL STRIP.AUTO: NEGATIVE
PH UR STRIP: 7 [PH] (ref 5–8)
PROT UR STRIP-MCNC: NEGATIVE MG/DL
RBC #/AREA URNS HPF: ABNORMAL /HPF (ref 0–5)
SERVICE CMNT-IMP: NORMAL
SP GR UR REFRACTOMETRY: 1.03 (ref 1–1.03)
T VAGINALIS VAG QL WET PREP: NORMAL
UA: UC IF INDICATED,UAUC: ABNORMAL
UROBILINOGEN UR QL STRIP.AUTO: 0.2 EU/DL (ref 0.2–1)
WBC URNS QL MICRO: ABNORMAL /HPF (ref 0–4)

## 2019-09-01 PROCEDURE — 74011250637 HC RX REV CODE- 250/637: Performed by: PHYSICIAN ASSISTANT

## 2019-09-01 PROCEDURE — 96372 THER/PROPH/DIAG INJ SC/IM: CPT

## 2019-09-01 PROCEDURE — 87210 SMEAR WET MOUNT SALINE/INK: CPT

## 2019-09-01 PROCEDURE — 74011250636 HC RX REV CODE- 250/636: Performed by: PHYSICIAN ASSISTANT

## 2019-09-01 PROCEDURE — 81025 URINE PREGNANCY TEST: CPT

## 2019-09-01 PROCEDURE — 99282 EMERGENCY DEPT VISIT SF MDM: CPT

## 2019-09-01 PROCEDURE — 81001 URINALYSIS AUTO W/SCOPE: CPT

## 2019-09-01 PROCEDURE — 87491 CHLMYD TRACH DNA AMP PROBE: CPT

## 2019-09-01 RX ORDER — AZITHROMYCIN 500 MG/1
1000 TABLET, FILM COATED ORAL
Status: COMPLETED | OUTPATIENT
Start: 2019-09-01 | End: 2019-09-01

## 2019-09-01 RX ADMIN — LIDOCAINE HYDROCHLORIDE 250 MG: 10 INJECTION, SOLUTION EPIDURAL; INFILTRATION; INTRACAUDAL; PERINEURAL at 12:48

## 2019-09-01 RX ADMIN — AZITHROMYCIN 1000 MG: 500 TABLET, FILM COATED ORAL at 12:48

## 2019-09-01 NOTE — ED PROVIDER NOTES
EMERGENCY DEPARTMENT HISTORY AND PHYSICAL EXAM      Date: 9/1/2019  Patient Name: Diana Hannon    History of Presenting Illness     Chief Complaint   Patient presents with    Abdominal Pain    Exposure to STD     pt reported she was dating and had unprotected sex x 1 week and she has abdominal pain,vaginal discharge,concerned to check STD. History Provided By: Patient    HPI: Diana Hannon, 52 y.o. female PMHx significant for arthritis, DM presents ambulatory to the ED with cc of intermittent cramping suprapubic abd pain x 1 week with assoc increase in white vaginal discharge and vaginal irritation. Pt states she had unprotected intercourse recently and would like to be tested for STDs. Denies aggravating or alleviating sx. Rates pain 8/10. Pt has not taken anything for sx. There are no other complaints, changes, or physical findings at this time. PCP: Kathrine Quevedo MD    No current facility-administered medications on file prior to encounter. Current Outpatient Medications on File Prior to Encounter   Medication Sig Dispense Refill    metformin HCl (METFORMIN PO) Take  by mouth.  FLUoxetine (PROZAC) 20 mg capsule Take 20 mg by mouth daily.  traZODone (DESYREL) 150 mg tablet Take 150 mg by mouth nightly. Past History     Past Medical History:  Past Medical History:   Diagnosis Date    Arthritis     Diabetes ([address]     Tobacco abuse 10/22/2014       Past Surgical History:  Past Surgical History:   Procedure Laterality Date    HX CHOLECYSTECTOMY  1992-[facility]       Family History:  Family History   Problem Relation Age of Onset    Hypertension Mother     Alcohol abuse Father        Social History:  Social History     Tobacco Use    Smoking status: Current Some Day Smoker    Smokeless tobacco: Never Used   Substance Use Topics    Alcohol use: No    Drug use: No       Allergies:   Allergies   Allergen Reactions    Bee Venom Protein (Honey Bee) Shortness of Breath         Review of Systems   Review of Systems   Constitutional: Negative for chills and fever. Respiratory: Negative for shortness of breath. Cardiovascular: Negative for chest pain. Gastrointestinal: Positive for abdominal pain. Negative for nausea and vomiting. Genitourinary: Positive for vaginal discharge. Negative for dysuria, flank pain, frequency, genital sores, vaginal bleeding and vaginal pain. Musculoskeletal: Negative for back pain and myalgias. Skin: Negative for color change, pallor, rash and wound. Neurological: Negative for dizziness, weakness and light-headedness. All other systems reviewed and are negative. Physical Exam   Physical Exam   Constitutional: She is oriented to person, place, and time. She appears well-developed and well-nourished. No distress. HENT:   Head: Normocephalic and atraumatic. Eyes: Conjunctivae are normal.   Cardiovascular: Normal rate, regular rhythm and normal heart sounds. Pulmonary/Chest: Effort normal and breath sounds normal. No respiratory distress. Abdominal: Soft. Bowel sounds are normal. She exhibits no distension. Abdomen soft, nontender   Musculoskeletal: Normal range of motion. Neurological: She is alert and oriented to person, place, and time. Skin: Skin is warm. No rash noted. Psychiatric: She has a normal mood and affect. Her behavior is normal.   Nursing note and vitals reviewed.       Diagnostic Study Results     Labs -     Recent Results (from the past 12 hour(s))   MARGARITA, OTHER SOURCES    Collection Time: 09/01/19 12:48 PM   Result Value Ref Range    Special Requests: NO SPECIAL REQUESTS      KOH NO YEAST SEEN     WET PREP    Collection Time: 09/01/19 12:48 PM   Result Value Ref Range    Clue cells CLUE CELLS ABSENT      Wet prep NO TRICHOMONAS SEEN     URINALYSIS W/ REFLEX CULTURE    Collection Time: 09/01/19  1:44 PM   Result Value Ref Range    Color YELLOW/STRAW      Appearance CLEAR CLEAR      Specific gravity 1.030 1.003 - 1.030      pH (UA) 7.0 5.0 - 8.0      Protein NEGATIVE  NEG mg/dL    Glucose >1,000 (A) NEG mg/dL    Ketone NEGATIVE  NEG mg/dL    Bilirubin NEGATIVE  NEG      Blood NEGATIVE  NEG      Urobilinogen 0.2 0.2 - 1.0 EU/dL    Nitrites NEGATIVE  NEG      Leukocyte Esterase NEGATIVE  NEG      WBC 0-4 0 - 4 /hpf    RBC 0-5 0 - 5 /hpf    Epithelial cells FEW FEW /lpf    Bacteria NEGATIVE  NEG /hpf    UA:UC IF INDICATED CULTURE NOT INDICATED BY UA RESULT CNI     HCG URINE, QL. - POC    Collection Time: 09/01/19  1:46 PM   Result Value Ref Range    Pregnancy test,urine (POC) NEGATIVE  NEG         Radiologic Studies -   No orders to display     CT Results  (Last 48 hours)    None        CXR Results  (Last 48 hours)    None          Medical Decision Making   I am the first provider for this patient. I reviewed the vital signs, available nursing notes, past medical history, past surgical history, family history and social history. Vital Signs-Reviewed the patient's vital signs. Patient Vitals for the past 12 hrs:   Temp Pulse Resp BP SpO2   09/01/19 1200 97.9 °F (36.6 °C) 70 17 142/57 97 %         Records Reviewed: Nursing Notes and Old Medical Records    Provider Notes (Medical Decision Making):   DDx: Gonorrhea, Chlamydia, Trich, UTI, BV, Yeast      ED Course:   Initial assessment performed. The patients presenting problems have been discussed, and they are in agreement with the care plan formulated and outlined with them. I have encouraged them to ask questions as they arise throughout their visit. ED Course as of Sep 01 1518   Sun Sep 01, 2019   1430 Pt states he needs to take a walk, and never returned. [ET]      ED Course User Index  [ET] ZACHARY Valdivia         Disposition:   Pt eloped prior to labs resulting. PLAN:  1. Current Discharge Medication List        2. Follow-up Information    None       Return to ED if worse     Diagnosis     Clinical Impression:   1.  Suprapubic abdominal pain        Attestations:    ZACHARY Bay    Please note that this dictation was completed with [employer], the computer voice recognition software. Quite often unanticipated grammatical, syntax, homophones, and other interpretive errors are inadvertently transcribed by the computer software. Please disregard these errors. Please excuse any errors that have escaped final proofreading. Thank you.

## 2019-09-01 NOTE — ED NOTES
Patient here with c/o abdominal/pelvic discomfort and concern for STD. Patient reports symptoms started after some recent unprotected sex. Patient denies fevers. Patient reports burning when she urinates. Emergency Department Nursing Plan of Care       The Nursing Plan of Care is developed from the Nursing assessment and Emergency Department Attending provider initial evaluation. The plan of care may be reviewed in the ED Provider note.     The Plan of Care was developed with the following considerations:   Patient / Family readiness to learn indicated by:verbalized understanding  Persons(s) to be included in education: patient  Barriers to Learning/Limitations:no    Signed     Earlene London RN    9/1/2019   12:30 PM

## 2019-09-10 ENCOUNTER — OFFICE VISIT (OUTPATIENT)
Dept: INTERNAL MEDICINE CLINIC | Age: 54
End: 2019-09-10

## 2019-09-10 VITALS
WEIGHT: 223.1 LBS | OXYGEN SATURATION: 95 % | DIASTOLIC BLOOD PRESSURE: 73 MMHG | RESPIRATION RATE: 18 BRPM | HEIGHT: 67 IN | HEART RATE: 91 BPM | TEMPERATURE: 97.7 F | SYSTOLIC BLOOD PRESSURE: 128 MMHG | BODY MASS INDEX: 35.02 KG/M2

## 2019-09-10 DIAGNOSIS — L73.9 FOLLICULITIS: ICD-10-CM

## 2019-09-10 DIAGNOSIS — G89.29 CHRONIC PAIN OF BOTH KNEES: ICD-10-CM

## 2019-09-10 DIAGNOSIS — Z12.11 SCREENING FOR COLON CANCER: ICD-10-CM

## 2019-09-10 DIAGNOSIS — F41.8 DEPRESSION WITH ANXIETY: ICD-10-CM

## 2019-09-10 DIAGNOSIS — Z11.59 SCREENING FOR VIRAL DISEASE: ICD-10-CM

## 2019-09-10 DIAGNOSIS — M25.561 CHRONIC PAIN OF BOTH KNEES: ICD-10-CM

## 2019-09-10 DIAGNOSIS — E11.9 CONTROLLED TYPE 2 DIABETES MELLITUS WITHOUT COMPLICATION, WITHOUT LONG-TERM CURRENT USE OF INSULIN (HCC): ICD-10-CM

## 2019-09-10 DIAGNOSIS — I83.93 VARICOSE VEINS OF BOTH LOWER EXTREMITIES, UNSPECIFIED WHETHER COMPLICATED: ICD-10-CM

## 2019-09-10 DIAGNOSIS — M25.562 CHRONIC PAIN OF BOTH KNEES: ICD-10-CM

## 2019-09-10 DIAGNOSIS — Z12.31 SCREENING MAMMOGRAM, ENCOUNTER FOR: ICD-10-CM

## 2019-09-10 DIAGNOSIS — Z76.89 ESTABLISHING CARE WITH NEW DOCTOR, ENCOUNTER FOR: Primary | ICD-10-CM

## 2019-09-10 DIAGNOSIS — Z11.3 SCREENING EXAMINATION FOR STD (SEXUALLY TRANSMITTED DISEASE): ICD-10-CM

## 2019-09-10 DIAGNOSIS — N89.8 VAGINAL IRRITATION: ICD-10-CM

## 2019-09-10 LAB
ALBUMIN UR QL STRIP: 30 MG/L
CREATININE, URINE POC: 300 MG/DL
MICROALBUMIN/CREAT RATIO POC: <30 MG/G

## 2019-09-10 RX ORDER — HYDROXYZINE PAMOATE 50 MG/1
25 CAPSULE ORAL
COMMUNITY
End: 2020-07-06

## 2019-09-10 RX ORDER — MICONAZOLE NITRATE 2 %
1 CREAM WITH APPLICATOR VAGINAL
Qty: 45 G | Refills: 0 | Status: SHIPPED | OUTPATIENT
Start: 2019-09-10 | End: 2020-07-06

## 2019-09-10 RX ORDER — TRAMADOL HYDROCHLORIDE 50 MG/1
50 TABLET ORAL
COMMUNITY
End: 2020-07-06

## 2019-09-10 RX ORDER — METFORMIN HYDROCHLORIDE 1000 MG/1
1000 TABLET ORAL 2 TIMES DAILY WITH MEALS
Qty: 180 TAB | Refills: 0 | Status: SHIPPED | OUTPATIENT
Start: 2019-09-10

## 2019-09-10 RX ORDER — ASPIRIN 81 MG/1
TABLET ORAL DAILY
COMMUNITY

## 2019-09-10 RX ORDER — DICLOFENAC SODIUM 10 MG/G
GEL TOPICAL 4 TIMES DAILY
Qty: 100 G | Refills: 3 | Status: SHIPPED | OUTPATIENT
Start: 2019-09-10

## 2019-09-10 NOTE — PROGRESS NOTES
Subjective: (As above and below)     Chief Complaint   Patient presents with   Jak Carry Establish Care    Knee Pain     both    Hernia (Non Specific)    Vaginal Itching     Pt states she may have yeast infection and she has a small mass on top of groin     Leanor Sprinkles. Tereso Forbes is a 52y.o. year old female who presents to [facility] and multiple concerns: Will address primary concerns today   She is followed by [facility] for psychiatric care, has an appt today    Knee pain: bilateral ain \"for a few years\", had xrays done at [facility] [place] years ago. [facility] gave her tramadol and lortab. She has not done PT (declines ,however bc she doesn't want to disrupt her new job schedule at risk termination -She works part time at [employer].) she also refuses the possibility of joint injections. .. She states that ibup has caused GI irritation    Vaginal irritation: reports vaginal itching, all tests negative in the ED last week, scant white discharge. Feels itching on the outside. Believes that her partner may have been to rougch    Hernia: approx 4 years ago she was doing a lot of lifting and states that she developed an umbilical hernia - she had imaging at [facility] - she states that the bulge her abdomen has grown, it is mildly uncomfortable. Pap:due  Mammo: due  Colonoscopy: never done    Smokin/2 PPD - cut down from 1 PPD    Bump to her groin: after shaving she noticed a mildly tender bump to her pubic area - non-draining    Diabetic Review of Systems - reports taking metformin - believes last labs 1 year ago. Diet is fair. Eye exam: due  ACE/ARB: states she takes lisinopril sometimes -does not know dose  STATIN: not taking    Reviewed PmHx, RxHx, FmHx, SocHx, AllgHx and updated in chart.   Family History   Problem Relation Age of Onset    Hypertension Mother     Alcohol abuse Father        Past Medical History:   Diagnosis Date    Anxiety 2019    Arthritis     Depression     Diabetes ([facility] [address]     Tobacco abuse 10/22/2014      Social History     Socioeconomic History    Marital status: SINGLE     Spouse name: Not on file    Number of children: Not on file    Years of education: Not on file    Highest education level: Not on file   Tobacco Use    Smoking status: Current Some Day Smoker    Smokeless tobacco: Never Used   Substance and Sexual Activity    Alcohol use: No    Drug use: No    Sexual activity: Yes     Partners: Male     Birth control/protection: None   Social History Narrative    10/22/14 Lives with boyfriend. Has GED. Reads well. Not working, used to do [employer]. Current Outpatient Medications   Medication Sig    traMADol (ULTRAM) 50 mg tablet Take 50 mg by mouth every six (6) hours as needed for Pain.  hydrOXYzine pamoate (VISTARIL) 50 mg capsule Take 25 mg by mouth three (3) times daily as needed for Itching.  aspirin delayed-release 81 mg tablet Take  by mouth daily.  metformin HCl (METFORMIN PO) Take  by mouth.  FLUoxetine (PROZAC) 20 mg capsule Take 20 mg by mouth daily.  traZODone (DESYREL) 150 mg tablet Take 150 mg by mouth nightly. No current facility-administered medications for this visit. Review of Systems:   Constitutional:    Negative for fever and chills, negative diaphoresis. HEENT:              Negative for neck pain and stiffness. Eyes:                  Negative for visual disturbance, itching, redness or discharge. Respiratory:        Negative for cough and shortness of breath. Cardiovascular:  Negative for chest pain and palpitations. Gastrointestinal: Negative for nausea, vomiting, abdominal pain, diarrhea or constipation. +abd bulge  Genitourinary:     Negative for dysuria and frequency. Musculoskeletal: bilat knee pain  Skin:                    Negative for rash, masses or lesions. +bump to groin  Neurological:       Negative for dizzyness, seizure, loss of consciousness, weakness and numbness.      Objective:     Vitals:    09/10/19 0809 BP: 128/73   Pulse: 91   Resp: 18   Temp: 97.7 °F (36.5 °C)   TempSrc: Oral   SpO2: 95%   Weight: 223 lb 1.6 oz (101.2 kg)   Height: 5' 7\" (1.702 m)       Results for orders placed or performed in visit on 09/10/19   AMB POC URINE, MICROALBUMIN, SEMIQUANT (3 RESULTS)   Result Value Ref Range    ALBUMIN, URINE POC 30 Negative mg/L    CREATININE, URINE  mg/dL    Microalbumin/creat ratio (POC) <30 <30 MG/G         Physical Examination: General appearance - alert, well appearing, and in no distress  Mental status - alert, oriented to person, place, and time  Ears - bilateral TM's and external ear canals normal  Chest - clear to auscultation, no wheezes, rales or rhonchi, symmetric air entry  Heart - normal rate, regular rhythm, normal S1, S2, no murmurs, rubs, clicks or gallops  Abdomen - soft, nontender, nondistended, no masses or organomegaly  HERNIA EXAM: umbilical hernia, nontender  Neurological - alert, oriented, normal speech, no focal findings or movement disorder noted  Musculoskeletal -bilateral knees: no redness/warmth/swellig. Pain to posterior aspect. No crepitus  Extremities - non-tender varicose veins noted bilat  Skin - pubis: small raised slightly tender bump - w/ central punctum - non-draining      Assessment/ Plan: Will get imaging from [facility] regarding hernia, can then refer to gen surg      1. Establishing care with new doctor, encounter for      2. Screening mammogram, encounter for    - [facility] MAMMO BI SCREENING INCL CAD; Future    3. Depression with anxiety  Followed by [facility]    4. Chronic pain of both knees  Advised no long term narcotics recc for arthritis  Update xrays, naproxen, topicals  Can refer to pain mgmt if needed  -naproxen w/ food recc  - XR KNEE RT 3 V; Future  - XR KNEE LT 3 V; Future  - diclofenac (VOLTAREN) 1 % gel; Apply  to affected area four (4) times daily. Dispense: 100 g; Refill: 3  - AMB SUPPLY ORDER    5.  Vaginal irritation  recc external monistat if itching, skin care    6. Screening examination for STD (sexually transmitted disease)    - HIV 1/2 AG/AB, 4TH GENERATION,W RFLX CONFIRM  - T PALLIDUM SCREEN W/REFLEX    7. Controlled type 2 diabetes mellitus without complication, without long-term current use of insulin ([facility] [address]  I have reviewed/discussed the above normal BMI with the patient. I have recommended the following interventions: dietary management education, guidance, and counseling and encourage exercise . Devon Mcginnis - METABOLIC PANEL, COMPREHENSIVE  - CBC W/O DIFF  - LIPID PANEL  - HEMOGLOBIN A1C WITH EAG  - AMB POC URINE, MICROALBUMIN, SEMIQUANT (3 RESULTS)    8. Screening for viral disease    - HEPATITIS C AB    9. Screening for colon cancer    - REFERRAL TO GASTROENTEROLOGY    10. Folliculitis  Warm compresses, soap & water, abx ointment. F/u INI    11. Varicose veins of both lower extremities, unspecified whether complicated    - Comp Stocking,Knee,Long,Medium misc; 1 Each by Does Not Apply route daily. Dispense: 1 Each; Refill: 0          I have discussed the diagnosis with the patient and the intended plan as seen in the above orders. The patient has received an after-visit summary and questions were answered concerning future plans. Pt conveyed understanding of plan. Medication Side Effects and Warnings were discussed with patient: yes  Patient Labs were reviewed: yes  Patient Past Records were reviewed:  yes    Nida Del Real.  Dannielle Adams NP

## 2019-09-10 NOTE — PROGRESS NOTES
Pt here for   Chief Complaint   Patient presents with   [facility] Establish Care    Knee Pain     both    Hernia (Non Specific)    Vaginal Itching     Pt states she may have yeast infection and she has a small mass on top of groin     1. Have you been to the ER, urgent care clinic since your last visit? Hospitalized since your last visit? Yes When: [facility] 2 weeks    2. Have you seen or consulted any other health care providers outside of the [address] [place] [address] since your last visit? Include any pap smears or colon screening.  No       Pt c/o pain 8 of 10, Pt denies taking anything for pain today    3 most recent PHQ Screens 9/10/2019   PHQ Not Done Active Diagnosis of Depression or Bipolar Disorder

## 2019-09-10 NOTE — PATIENT INSTRUCTIONS
1. You tested negative for a yeast infection, if you have some external itching you may want to try applying some monistat cream to the outside of your vagina, don't use harsh soaps, dry yourself well after showering and wear loose fitting pajamas to bed. I tried to sent the monistat to your pharmacy, but it is over the counter    2. Bump at the top of your groin: soap & water, warm compresses to promote drainage. Antibiotic ointment. If it gets bigger and is NOT draining you may need to go to the ED to have it drainaged    3. Orders for labs, mammogram, colonoscopy referral today    4. Please schedule to have your pap smear done (cervical cancer screening)    5. Knee pain: xrays today, naproxen sent - try taking it with food to avoid stomach irritation. Narcotics are not recommended to arthritis typically. I will try to get you compression stockings and a knee brace, but these can often be purchased faster at [employer] .. I have also sent a topical pain gel for your knees    6. I will get records from [facility] about the imaging for your hernia, next step would be a surgery consult if it is bothersome         A Healthy Lifestyle: Care Instructions  Your Care Instructions    A healthy lifestyle can help you feel good, stay at a healthy weight, and have plenty of energy for both work and play. A healthy lifestyle is something you can share with your whole family. A healthy lifestyle also can lower your risk for serious health problems, such as high blood pressure, heart disease, and diabetes. You can follow a few steps listed below to improve your health and the health of your family. Follow-up care is a key part of your treatment and safety. Be sure to make and go to all appointments, and call your doctor if you are having problems. It's also a good idea to know your test results and keep a list of the medicines you take. How can you care for yourself at home? · Do not eat too much sugar, fat, or fast foods.  You can still have dessert and treats now and then. The goal is moderation. · Start small to improve your eating habits. Pay attention to portion sizes, drink less juice and soda pop, and eat more fruits and vegetables. ? Eat a healthy amount of food. A 3-ounce serving of meat, for example, is about the size of a deck of cards. Fill the rest of your plate with vegetables and whole grains. ? Limit the amount of soda and sports drinks you have every day. Drink more water when you are thirsty. ? Eat at least 5 servings of fruits and vegetables every day. It may seem like a lot, but it is not hard to reach this goal. A serving or helping is 1 piece of fruit, 1 cup of vegetables, or 2 cups of leafy, raw vegetables. Have an apple or some carrot sticks as an afternoon snack instead of a candy bar. Try to have fruits and/or vegetables at every meal.  · Make exercise part of your daily routine. You may want to start with simple activities, such as walking, bicycling, or slow swimming. Try to be active 30 to 60 minutes every day. You do not need to do all 30 to 60 minutes all at once. For example, you can exercise 3 times a day for 10 or 20 minutes. Moderate exercise is safe for most people, but it is always a good idea to talk to your doctor before starting an exercise program.  · Keep moving. Sharlynn Fothergill the lawn, work in the garden, or ngmoco. Take the stairs instead of the elevator at work. · If you smoke, quit. People who smoke have an increased risk for heart attack, stroke, cancer, and other lung illnesses. Quitting is hard, but there are ways to boost your chance of quitting tobacco for good. ? Use nicotine gum, patches, or lozenges. ? Ask your doctor about stop-smoking programs and medicines. ? Keep trying.   In addition to reducing your risk of diseases in the future, you will notice some benefits soon after you stop using tobacco. If you have shortness of breath or asthma symptoms, they will likely get better within a few weeks after you quit. · Limit how much alcohol you drink. Moderate amounts of alcohol (up to 2 drinks a day for men, 1 drink a day for women) are okay. But drinking too much can lead to liver problems, high blood pressure, and other health problems. Family health  If you have a family, there are many things you can do together to improve your health. · Eat meals together as a family as often as possible. · Eat healthy foods. This includes fruits, vegetables, lean meats and dairy, and whole grains. · Include your family in your fitness plan. Most people think of activities such as jogging or tennis as the way to fitness, but there are many ways you and your family can be more active. Anything that makes you breathe hard and gets your heart pumping is exercise. Here are some tips:  ? Walk to do errands or to take your child to school or the bus.  ? Go for a family bike ride after dinner instead of watching TV. Where can you learn more? Go to http://willian-linus.info/. Enter X546 in the search box to learn more about \"A Healthy Lifestyle: Care Instructions. \"  Current as of: September 11, 2018  Content Version: 12.1  © 2988-7867 [employer]. Care instructions adapted under license by [employer] (which disclaims liability or warranty for this information). If you have questions about a medical condition or this instruction, always ask your healthcare professional. [place] [address] any warranty or liability for your use of this information.

## 2019-09-24 ENCOUNTER — APPOINTMENT (OUTPATIENT)
Dept: CT IMAGING | Age: 54
End: 2019-09-24
Attending: EMERGENCY MEDICINE
Payer: MEDICAID

## 2019-09-24 ENCOUNTER — HOSPITAL ENCOUNTER (EMERGENCY)
Age: 54
Discharge: HOME | End: 2019-09-24
Attending: EMERGENCY MEDICINE
Payer: MEDICAID

## 2019-09-24 VITALS
HEIGHT: 67 IN | TEMPERATURE: 98.9 F | BODY MASS INDEX: 35.16 KG/M2 | SYSTOLIC BLOOD PRESSURE: 124 MMHG | HEART RATE: 90 BPM | OXYGEN SATURATION: 99 % | DIASTOLIC BLOOD PRESSURE: 64 MMHG | WEIGHT: 224 LBS | RESPIRATION RATE: 16 BRPM

## 2019-09-24 DIAGNOSIS — S20.212A RIB CONTUSION, LEFT, INITIAL ENCOUNTER: Primary | ICD-10-CM

## 2019-09-24 DIAGNOSIS — R91.8 PULMONARY NODULES: ICD-10-CM

## 2019-09-24 DIAGNOSIS — S50.812A ABRASION OF LEFT FOREARM, INITIAL ENCOUNTER: ICD-10-CM

## 2019-09-24 LAB
ANION GAP BLD CALC-SCNC: 12 MMOL/L (ref 10–20)
BUN BLD-MCNC: 16 MG/DL (ref 9–20)
CA-I BLD-MCNC: 1.11 MMOL/L (ref 1.12–1.32)
CHLORIDE BLD-SCNC: 99 MMOL/L (ref 98–107)
CO2 BLD-SCNC: 29 MMOL/L (ref 21–32)
CREAT BLD-MCNC: 0.7 MG/DL (ref 0.6–1.3)
GLUCOSE BLD-MCNC: 311 MG/DL (ref 65–100)
HCT VFR BLD CALC: 46 % (ref 35–47)
POTASSIUM BLD-SCNC: 4.9 MMOL/L (ref 3.5–5.1)
SERVICE CMNT-IMP: ABNORMAL
SODIUM BLD-SCNC: 134 MMOL/L (ref 136–145)

## 2019-09-24 PROCEDURE — 80047 BASIC METABLC PNL IONIZED CA: CPT

## 2019-09-24 PROCEDURE — 74011000250 HC RX REV CODE- 250: Performed by: EMERGENCY MEDICINE

## 2019-09-24 PROCEDURE — 74011250637 HC RX REV CODE- 250/637: Performed by: EMERGENCY MEDICINE

## 2019-09-24 PROCEDURE — 71250 CT THORAX DX C-: CPT

## 2019-09-24 PROCEDURE — 74176 CT ABD & PELVIS W/O CONTRAST: CPT

## 2019-09-24 PROCEDURE — 99284 EMERGENCY DEPT VISIT MOD MDM: CPT

## 2019-09-24 RX ORDER — HYDROCODONE BITARTRATE AND ACETAMINOPHEN 5; 325 MG/1; MG/1
1 TABLET ORAL
Qty: 6 TAB | Refills: 0 | Status: SHIPPED | OUTPATIENT
Start: 2019-09-24 | End: 2019-09-27

## 2019-09-24 RX ORDER — KETOROLAC TROMETHAMINE 30 MG/ML
15 INJECTION, SOLUTION INTRAMUSCULAR; INTRAVENOUS
Status: DISCONTINUED | OUTPATIENT
Start: 2019-09-24 | End: 2019-09-24

## 2019-09-24 RX ORDER — HYDROCODONE BITARTRATE AND ACETAMINOPHEN 5; 325 MG/1; MG/1
1 TABLET ORAL ONCE
Status: COMPLETED | OUTPATIENT
Start: 2019-09-24 | End: 2019-09-24

## 2019-09-24 RX ORDER — IBUPROFEN 800 MG/1
800 TABLET ORAL
Qty: 20 TAB | Refills: 0 | Status: SHIPPED | OUTPATIENT
Start: 2019-09-24 | End: 2019-10-01

## 2019-09-24 RX ORDER — LIDOCAINE 4 G/100G
1 PATCH TOPICAL ONCE
Status: DISCONTINUED | OUTPATIENT
Start: 2019-09-24 | End: 2019-09-25 | Stop reason: HOSPADM

## 2019-09-24 RX ORDER — IBUPROFEN 400 MG/1
800 TABLET ORAL ONCE
Status: COMPLETED | OUTPATIENT
Start: 2019-09-24 | End: 2019-09-24

## 2019-09-24 RX ORDER — LIDOCAINE 4 G/100G
PATCH TOPICAL
Qty: 10 PATCH | Refills: 0 | Status: SHIPPED | OUTPATIENT
Start: 2019-09-24

## 2019-09-24 RX ORDER — FENTANYL CITRATE 50 UG/ML
50 INJECTION, SOLUTION INTRAMUSCULAR; INTRAVENOUS
Status: DISCONTINUED | OUTPATIENT
Start: 2019-09-24 | End: 2019-09-24

## 2019-09-24 RX ADMIN — IBUPROFEN 800 MG: 400 TABLET, FILM COATED ORAL at 22:25

## 2019-09-24 RX ADMIN — HYDROCODONE BITARTRATE AND ACETAMINOPHEN 1 TABLET: 5; 325 TABLET ORAL at 21:22

## 2019-09-24 NOTE — LETTER
[facility] EMERGENCY DEPT 
[address] [address] 
[phone] Work/School Note Date: 9/24/2019 To Whom It May concern: 
 
Akua Downs was seen and treated today in the emergency room by the following provider(s): 
Attending Provider: Harshad Gerardo, [facility] may return to work on 9/27.  
 
Sincerely, 
 
 
 
 
Wilman Slater MD
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
## 2019-09-25 NOTE — ED NOTES
..Discharge summary and discharge medications reviewed with patient and appropriate educational materials and side effects teaching were provided. patient  Given 1 paper prescriptions and 2 electronic prescriptions sent to pt's listed pharmacy. Patient verbalized understanding of the importance of discussing medications with his or her physician or clinic they will be following up with. No si/s of acute distress prior to discharge. Patient offered wheelchair from treatment area to hospital entrance, patient declined wheelchair.

## 2020-07-06 ENCOUNTER — HOSPITAL ENCOUNTER (EMERGENCY)
Age: 55
Discharge: HOME | End: 2020-07-06
Attending: EMERGENCY MEDICINE | Admitting: EMERGENCY MEDICINE
Payer: MEDICAID

## 2020-07-06 VITALS
RESPIRATION RATE: 16 BRPM | SYSTOLIC BLOOD PRESSURE: 136 MMHG | DIASTOLIC BLOOD PRESSURE: 77 MMHG | BODY MASS INDEX: 31.98 KG/M2 | TEMPERATURE: 98.9 F | OXYGEN SATURATION: 98 % | WEIGHT: 211 LBS | HEIGHT: 68 IN | HEART RATE: 92 BPM

## 2020-07-06 DIAGNOSIS — B37.31 CANDIDAL VULVOVAGINITIS: Primary | ICD-10-CM

## 2020-07-06 DIAGNOSIS — R73.09 ELEVATED GLUCOSE: ICD-10-CM

## 2020-07-06 DIAGNOSIS — Z86.39 HISTORY OF DIABETES MELLITUS, TYPE II: ICD-10-CM

## 2020-07-06 LAB
APPEARANCE UR: CLEAR
BACTERIA URNS QL MICRO: NEGATIVE /HPF
BILIRUB UR QL: NEGATIVE
CLUE CELLS VAG QL WET PREP: NORMAL
COLOR UR: ABNORMAL
EPITH CASTS URNS QL MICRO: ABNORMAL /LPF
GLUCOSE BLD STRIP.AUTO-MCNC: 327 MG/DL (ref 65–100)
GLUCOSE UR STRIP.AUTO-MCNC: >1000 MG/DL
HGB UR QL STRIP: NEGATIVE
KETONES UR QL STRIP.AUTO: NEGATIVE MG/DL
KOH PREP SPEC: NORMAL
KOH PREP SPEC: NORMAL
LEUKOCYTE ESTERASE UR QL STRIP.AUTO: NEGATIVE
NITRITE UR QL STRIP.AUTO: NEGATIVE
PH UR STRIP: 5 [PH] (ref 5–8)
PROT UR STRIP-MCNC: NEGATIVE MG/DL
RBC #/AREA URNS HPF: ABNORMAL /HPF (ref 0–5)
SERVICE CMNT-IMP: ABNORMAL
SERVICE CMNT-IMP: NORMAL
SP GR UR REFRACTOMETRY: 1.02 (ref 1–1.03)
T VAGINALIS VAG QL WET PREP: NORMAL
UA: UC IF INDICATED,UAUC: ABNORMAL
UROBILINOGEN UR QL STRIP.AUTO: 0.2 EU/DL (ref 0.2–1)
WBC URNS QL MICRO: ABNORMAL /HPF (ref 0–4)

## 2020-07-06 PROCEDURE — 87210 SMEAR WET MOUNT SALINE/INK: CPT

## 2020-07-06 PROCEDURE — 99283 EMERGENCY DEPT VISIT LOW MDM: CPT

## 2020-07-06 PROCEDURE — 87491 CHLMYD TRACH DNA AMP PROBE: CPT

## 2020-07-06 PROCEDURE — 82962 GLUCOSE BLOOD TEST: CPT

## 2020-07-06 PROCEDURE — 74011250637 HC RX REV CODE- 250/637: Performed by: PHYSICIAN ASSISTANT

## 2020-07-06 PROCEDURE — 81001 URINALYSIS AUTO W/SCOPE: CPT

## 2020-07-06 RX ORDER — FLUCONAZOLE 150 MG/1
TABLET ORAL
Qty: 2 TAB | Refills: 0 | Status: SHIPPED | OUTPATIENT
Start: 2020-07-09

## 2020-07-06 RX ORDER — FLUCONAZOLE 200 MG/1
200 TABLET ORAL
Status: COMPLETED | OUTPATIENT
Start: 2020-07-06 | End: 2020-07-06

## 2020-07-06 RX ORDER — ASPIRIN 325 MG
1 TABLET, DELAYED RELEASE (ENTERIC COATED) ORAL
Qty: 45 G | Refills: 0 | Status: SHIPPED | OUTPATIENT
Start: 2020-07-06

## 2020-07-06 RX ADMIN — FLUCONAZOLE 200 MG: 200 TABLET ORAL at 22:31

## 2020-07-07 NOTE — ED TRIAGE NOTES
Pt to er with sharp vaginal pain x 3-4 days not relieved with ibuprofen or tylenol. Pt denies any abdominal pain or urinary symptoms.

## 2020-07-07 NOTE — ED NOTES
No fluconazole in ER pyxus.  [address] pharmacy to change disposition of med to 2nd floor and called [facility] to get med from 2nd floor

## 2020-07-07 NOTE — ED PROVIDER NOTES
EMERGENCY DEPARTMENT HISTORY AND PHYSICAL EXAM      Date: 7/6/2020  Patient Name: Silvia Phelps    History of Presenting Illness     Chief Complaint   Patient presents with    Vaginal Pain       History Provided By: Patient    HPI: Silvia Phelps, 62 y.o. female with vaginal irritation and pain. Patient states that she has felt like her vaginal area is swollen and irritated and painful to touch. Denies any painful urination, vaginal discharge, abdominal pain, flank pain or any other  symptoms. She does have a history of diabetes she does not check her sugars regularly and she takes metformin daily. Denies smoking, alcohol, drug use. Pains are approximately 9 out of 10 feels like a painful and swelling sensation in her vaginal area. Please note for examination and HPI were completed I did introduce myself as the physician assistant. Please note that this dictation was completed with [employer], the [employer] voice recognition software. Quite often unanticipated grammatical, syntax, homophones, and other interpretive errors are inadvertently transcribed by the computer software. Please disregard these errors. Please excuse any errors that have escaped final proofreading. For further clarification on any chart please contact myself. Thank you. There are no other complaints, changes, or physical findings at this time. PCP: Chaim Pruitt., NP    No current facility-administered medications on file prior to encounter. Current Outpatient Medications on File Prior to Encounter   Medication Sig Dispense Refill    lidocaine 4 % patch Apply 1 patch for 12 hours. No more than 1 patch in 24 hours. 10 Patch 0    aspirin delayed-release 81 mg tablet Take  by mouth daily.  metFORMIN (GLUCOPHAGE) 1,000 mg tablet Take 1 Tab by mouth two (2) times daily (with meals). 180 Tab 0    FLUoxetine (PROZAC) 20 mg capsule Take 20 mg by mouth daily.       diclofenac (VOLTAREN) 1 % gel Apply  to affected area four (4) times daily. 100 g 3    Comp Stocking,Knee,Long,Medium misc 1 Each by Does Not Apply route daily. 1 Each 0    [DISCONTINUED] traMADol (ULTRAM) 50 mg tablet Take 50 mg by mouth every six (6) hours as needed for Pain.  [DISCONTINUED] hydrOXYzine pamoate (VISTARIL) 50 mg capsule Take 25 mg by mouth three (3) times daily as needed for Itching.  [DISCONTINUED] miconazole (MONISTAT 7) 2 % vaginal cream Insert 1 Applicator into vagina nightly. 45 g 0    [DISCONTINUED] traZODone (DESYREL) 150 mg tablet Take 150 mg by mouth nightly. Past History     Past Medical History:  Past Medical History:   Diagnosis Date    Anxiety 2019    Arthritis     Depression     Diabetes ([address]     Tobacco abuse 10/22/2014       Past Surgical History:  Past Surgical History:   Procedure Laterality Date    HX CHOLECYSTECTOMY  1992-[facility]       Family History:  Family History   Problem Relation Age of Onset    Hypertension Mother     Alcohol abuse Father        Social History:  Social History     Tobacco Use    Smoking status: Current Some Day Smoker     Packs/day: 0.50    Smokeless tobacco: Never Used   Substance Use Topics    Alcohol use: No    Drug use: Yes     Types: Cocaine, Marijuana     Comment: 3 days       Allergies: Allergies   Allergen Reactions    Bee Venom Protein (Honey Bee) Shortness of Breath         Review of Systems   Review of Systems   Genitourinary: Positive for vaginal pain. All other systems reviewed and are negative. Physical Exam   Physical Exam  Vitals signs and nursing note reviewed. Exam conducted with a chaperone present. Constitutional:       Appearance: She is well-developed. HENT:      Head: Normocephalic and atraumatic. Eyes:      Conjunctiva/sclera: Conjunctivae normal.      Pupils: Pupils are equal, round, and reactive to light. Neck:      Musculoskeletal: Normal range of motion and neck supple. Thyroid: No thyromegaly.    Cardiovascular: Rate and Rhythm: Normal rate and regular rhythm. Heart sounds: Normal heart sounds. No murmur. Pulmonary:      Effort: Pulmonary effort is normal. No respiratory distress. Breath sounds: Normal breath sounds. No stridor. No wheezing. Abdominal:      General: Bowel sounds are normal.      Palpations: Abdomen is soft. Tenderness: There is no abdominal tenderness. There is no right CVA tenderness or left CVA tenderness. Genitourinary:     Comments: Female RN  [employer] as chaperone. Significant vaginal candidal infection appreciated with swelling noted to the labia majora and clitoris area. Patient refused speculum exam due to the significance of pain elected for vaginal swabs instead. Musculoskeletal: Normal range of motion. General: No tenderness. Lymphadenopathy:      Cervical: No cervical adenopathy. Skin:     General: Skin is warm. Neurological:      Mental Status: She is alert and oriented to person, place, and time. Deep Tendon Reflexes: Reflexes are normal and symmetric.    Psychiatric:         Judgment: Judgment normal.         Diagnostic Study Results     Labs -     Recent Results (from the past 12 hour(s))   MARGARITA, OTHER SOURCES    Collection Time: 07/06/20  8:44 PM   Result Value Ref Range    Special Requests: NO SPECIAL REQUESTS      KOH 1+      KOH YEAST WITH PSEUDOHYPHAE     WET PREP    Collection Time: 07/06/20  8:44 PM   Result Value Ref Range    Clue cells CLUE CELLS ABSENT      Wet prep NO TRICHOMONAS SEEN     URINALYSIS W/ REFLEX CULTURE    Collection Time: 07/06/20  8:44 PM   Result Value Ref Range    Color YELLOW/STRAW      Appearance CLEAR CLEAR      Specific gravity 1.020 1.003 - 1.030      pH (UA) 5.0 5.0 - 8.0      Protein Negative NEG mg/dL    Glucose >1,000 (A) NEG mg/dL    Ketone Negative NEG mg/dL    Bilirubin Negative NEG      Blood Negative NEG      Urobilinogen 0.2 0.2 - 1.0 EU/dL    Nitrites Negative NEG      Leukocyte Esterase Negative NEG      WBC 0-4 0 - 4 /hpf    RBC 0-5 0 - 5 /hpf    Epithelial cells FEW FEW /lpf    Bacteria Negative NEG /hpf    UA:UC IF INDICATED CULTURE NOT INDICATED BY UA RESULT CNI     GLUCOSE, POC    Collection Time: 07/06/20  9:46 PM   Result Value Ref Range    Glucose (POC) 327 (H) 65 - 100 mg/dL    Performed by Adam Keyes RN        Radiologic Studies -   No orders to display     CT Results  (Last 48 hours)    None        CXR Results  (Last 48 hours)    None            Medical Decision Making   I am the first provider for this patient. I reviewed the vital signs, available nursing notes, past medical history, past surgical history, family history and social history. Vital Signs-Reviewed the patient's vital signs. Patient Vitals for the past 12 hrs:   Temp Pulse Resp BP SpO2   07/06/20 2034 98.9 °F (37.2 °C) 92 16 136/77 98 %       Records Reviewed: Nursing Notes    Provider Notes (Medical Decision Making): At this time patient has clear candidal infection of is appreciated on KOH stain and also on visual expection. She is currently taking trazodone and was advised to stop that for a week as she will be given 3 separate courses of Diflucan. I also advised patient to take clotrimazole nightly as she has a severe infection. I did advise her to return for any new or worsening complications or follow-up with primary care/ OB specialist for further evaluation. ED Course:   Initial assessment performed. The patients presenting problems have been discussed, and they are in agreement with the care plan formulated and outlined with them. I have encouraged them to ask questions as they arise throughout their visit. Critical Care Time: None    Disposition:  Dispo    PLAN:  1. Current Discharge Medication List      START taking these medications    Details   clotrimazole (MYCELEX) 1 % vaginal cream Insert 1 Applicator into vagina nightly.  For 2 weeks  Qty: 45 g, Refills: 0      fluconazole (DIFLUCAN) 150 mg tablet Take 1 tablet by mouth July 9 and take second tablet by mouth July 12  Qty: 2 Tab, Refills: 0         CONTINUE these medications which have NOT CHANGED    Details   lidocaine 4 % patch Apply 1 patch for 12 hours. No more than 1 patch in 24 hours. Qty: 10 Patch, Refills: 0      aspirin delayed-release 81 mg tablet Take  by mouth daily. metFORMIN (GLUCOPHAGE) 1,000 mg tablet Take 1 Tab by mouth two (2) times daily (with meals). Qty: 180 Tab, Refills: 0      FLUoxetine (PROZAC) 20 mg capsule Take 20 mg by mouth daily. diclofenac (VOLTAREN) 1 % gel Apply  to affected area four (4) times daily. Qty: 100 g, Refills: 3    Associated Diagnoses: Chronic pain of both knees      Comp Stocking,Knee,Long,Medium misc 1 Each by Does Not Apply route daily. Qty: 1 Each, Refills: 0    Associated Diagnoses: Varicose veins of both lower extremities, unspecified whether complicated           2. Follow-up Information     Follow up With Specialties Details Why Contact Info    Jeremy Powell, JANA Nurse Practitioner   \[facility]\""  [address]      [address] [place] [address] Gynecology   \[facility]\""  [address] [facility] Gynecology    [facility] [phone]        Return to ED if worse     Diagnosis     Clinical Impression:   1. Candidal vulvovaginitis    2. History of diabetes mellitus, type II    3. Elevated glucose          Please note that this dictation was completed with [employer], the computer voice recognition software. Quite often unanticipated grammatical, syntax, homophones, and other interpretive errors are inadvertently transcribed by the computer software. Please disregards these errors. Please excuse any errors that have escaped final proofreading. This note will not be viewable in [address]

## 2020-07-07 NOTE — DISCHARGE INSTRUCTIONS
Patient Education        Vaginal Yeast Infection: Care Instructions  Your Care Instructions     A vaginal yeast infection is caused by too many yeast cells in the vagina. This is common in women of all ages. Itching, vaginal discharge and irritation, and other symptoms can bother you. But yeast infections don't often cause other health problems. Some medicines can increase your risk of getting a yeast infection. These include antibiotics, birth control pills, hormones, and steroids. You may also be more likely to get a yeast infection if you are pregnant, have diabetes, douche, or wear tight clothes. With treatment, most yeast infections get better in 2 to 3 days. Follow-up care is a key part of your treatment and safety. Be sure to make and go to all appointments, and call your doctor if you are having problems. It's also a good idea to know your test results and keep a list of the medicines you take. How can you care for yourself at home? · Take your medicines exactly as prescribed. Call your doctor if you think you are having a problem with your medicine. · Ask your doctor about over-the-counter (OTC) medicines for yeast infections. They may cost less than prescription medicines. If you use an OTC treatment, read and follow all instructions on the label. · Do not use tampons while using a vaginal cream or suppository. The tampons can absorb the medicine. Use pads instead. · Wear loose cotton clothing. Do not wear nylon or other fabric that holds body heat and moisture close to the skin. · Try sleeping without underwear. · Do not scratch. Relieve itching with a cold pack or a cool bath. · Do not wash your vaginal area more than once a day. Use plain water or a mild, unscented soap. Air-dry the vaginal area. · Change out of wet swimsuits after swimming. · Do not have sex until you have finished your treatment. · Do not douche. When should you call for help?    Call your doctor now or seek immediate medical care if:  · You have unexpected vaginal bleeding. · You have new or increased pain in your vagina or pelvis. Watch closely for changes in your health, and be sure to contact your doctor if:  · You have a fever. · You are not getting better after 2 days. · Your symptoms come back after you finish your medicines. Where can you learn more? Go to http://willian-linus.info/  Enter Z955 in the search box to learn more about \"Vaginal Yeast Infection: Care Instructions. \"  Current as of: November 8, 2019               Content Version: 12.5  © 1896-2626 [employer]. Care instructions adapted under license by [employer] (which disclaims liability or warranty for this information). If you have questions about a medical condition or this instruction, always ask your healthcare professional. [address] any warranty or liability for your use of this information. Patient Education        Candidiasis: Care Instructions  Your Care Instructions  Candidiasis (say \"iqw-vxb-UJ-uh-alma\") is a yeast infection. Yeast normally lives in your body. But it can cause problems if your body's defenses don't work as they should. Some medicines can increase your chance of getting a yeast infection. These include antibiotics, steroids, and cancer drugs. And some diseases like AIDS and diabetes can make you more likely to get yeast infections. There are different types of yeast infections. Gaurav Crank is a yeast infection in the mouth. It usually occurs in people with weak immune systems. It causes white patches inside the mouth and throat. Yeast infections of the skin usually occur in skin folds where the skin stays moist. They cause red, oozing patches on your skin. Babies can get these infections under the diaper. People who often wear gloves can get them on their hands. Many women get vaginal yeast infections. They are most common when women take antibiotics. These infections can cause the vagina to itch and burn. They also cause white discharge that looks like cottage cheese. In rare cases, yeast infects the blood. This can cause serious disease. This kind of infection is treated with medicine given through a needle into a vein (IV). After you start treatment, a yeast infection usually goes away quickly. But if your immune system is weak, the infection may come back. Tell your doctor if you get yeast infections often. Follow-up care is a key part of your treatment and safety. Be sure to make and go to all appointments, and call your doctor if you are having problems. It's also a good idea to know your test results and keep a list of the medicines you take. How can you care for yourself at home? · Take your medicines exactly as prescribed. Call your doctor if you think you are having a problem with your medicine. · Use antibiotics only as directed by your doctor. · Eat yogurt with live cultures. It has bacteria called lactobacillus. It may help prevent some types of yeast infections. · Keep your skin clean and dry. Put powder on moist places. · If you are using a cream or suppository to treat a vaginal yeast infection, don't use condoms or a diaphragm. Use a different type of birth control. · Eat a healthy diet and get regular exercise. This will help keep your immune system strong. When should you call for help? Watch closely for changes in your health, and be sure to contact your doctor if:  · You do not get better as expected. Where can you learn more? Go to http://willian-linus.info/  Enter N056 in the search box to learn more about \"Candidiasis: Care Instructions. \"  Current as of: November 8, 2019               Content Version: 12.5  © 7525-0980 [employer]. Care instructions adapted under license by [employer] (which disclaims liability or warranty for this information).  If you have questions about a medical condition or this instruction, always ask your healthcare professional. [place] [address] any warranty or liability for your use of this information.

## 2020-07-07 NOTE — ED NOTES
Emergency Department Nursing Plan of Care       The Nursing Plan of Care is developed from the Nursing assessment and Emergency Department Attending provider initial evaluation. The plan of care may be reviewed in the ED Provider note. The Plan of Care was developed with the following considerations:   Patient / Family readiness to learn indicated by:verbalized understanding  Persons(s) to be included in education: patient  Barriers to Learning/Limitations:No    Signed     Eze Herrera    7/6/2020   9:17 PM    Pt reports to ER w/ vaginal pain/ tenderness w/ mild white mucus like discharge starting 3 days ago. Reports it has only worsened as the days have gone by. No reports of new product or trauma to the area. Alert and oriented x 4. Skin warm dry and intact. Ambulates independently.

## 2021-06-30 ENCOUNTER — HOSPITAL ENCOUNTER (EMERGENCY)
Age: 56
Discharge: HOME | End: 2021-06-30
Attending: EMERGENCY MEDICINE | Admitting: EMERGENCY MEDICINE
Payer: MEDICAID

## 2021-06-30 VITALS
OXYGEN SATURATION: 94 % | BODY MASS INDEX: 33.27 KG/M2 | TEMPERATURE: 97.6 F | HEIGHT: 67 IN | RESPIRATION RATE: 16 BRPM | WEIGHT: 212 LBS | DIASTOLIC BLOOD PRESSURE: 80 MMHG | HEART RATE: 67 BPM | SYSTOLIC BLOOD PRESSURE: 162 MMHG

## 2021-06-30 DIAGNOSIS — T40.2X1A OPIOID OVERDOSE, ACCIDENTAL OR UNINTENTIONAL, INITIAL ENCOUNTER (HCC): Primary | ICD-10-CM

## 2021-06-30 LAB
AMPHET UR QL SCN: NEGATIVE
BARBITURATES UR QL SCN: NEGATIVE
BENZODIAZ UR QL: NEGATIVE
CANNABINOIDS UR QL SCN: POSITIVE
COCAINE UR QL SCN: POSITIVE
COMMENT, HOLDF: NORMAL
DRUG SCRN COMMENT,DRGCM: ABNORMAL
ETHANOL SERPL-MCNC: <10 MG/DL
METHADONE UR QL: NEGATIVE
OPIATES UR QL: NEGATIVE
PCP UR QL: NEGATIVE
SAMPLES BEING HELD,HOLD: NORMAL

## 2021-06-30 PROCEDURE — 96361 HYDRATE IV INFUSION ADD-ON: CPT

## 2021-06-30 PROCEDURE — 74011250636 HC RX REV CODE- 250/636: Performed by: EMERGENCY MEDICINE

## 2021-06-30 PROCEDURE — 99284 EMERGENCY DEPT VISIT MOD MDM: CPT

## 2021-06-30 PROCEDURE — 96374 THER/PROPH/DIAG INJ IV PUSH: CPT

## 2021-06-30 PROCEDURE — 82077 ASSAY SPEC XCP UR&BREATH IA: CPT

## 2021-06-30 PROCEDURE — 80307 DRUG TEST PRSMV CHEM ANLYZR: CPT

## 2021-06-30 PROCEDURE — 36415 COLL VENOUS BLD VENIPUNCTURE: CPT

## 2021-06-30 RX ORDER — NALOXONE HYDROCHLORIDE 4 MG/.1ML
SPRAY NASAL
Qty: 2 EACH | Refills: 2 | Status: SHIPPED | OUTPATIENT
Start: 2021-06-30

## 2021-06-30 RX ORDER — SODIUM CHLORIDE 9 MG/ML
150 INJECTION, SOLUTION INTRAVENOUS CONTINUOUS
Status: DISCONTINUED | OUTPATIENT
Start: 2021-06-30 | End: 2021-07-01 | Stop reason: HOSPADM

## 2021-06-30 RX ORDER — ONDANSETRON 2 MG/ML
4 INJECTION INTRAMUSCULAR; INTRAVENOUS
Status: COMPLETED | OUTPATIENT
Start: 2021-06-30 | End: 2021-06-30

## 2021-06-30 RX ORDER — TRAZODONE HYDROCHLORIDE 50 MG/1
50 TABLET ORAL
COMMUNITY

## 2021-06-30 RX ADMIN — ONDANSETRON 4 MG: 2 INJECTION INTRAMUSCULAR; INTRAVENOUS at 20:25

## 2021-06-30 RX ADMIN — SODIUM CHLORIDE 150 ML/HR: 9 INJECTION, SOLUTION INTRAVENOUS at 20:17

## 2021-07-01 NOTE — ED NOTES
Assumed patient care for task only. Patient  given copy of dc instructions and 0 paper script(s) and 1 electronic scripts. Patient verbalized understanding of instructions and script (s). Patient given a current medication reconciliation form and verbalized understanding of their medications. Patient verbalized understanding of the importance of discussing medications with  his or her physician or clinic they will be following up with. Patient alert and oriented and in no acute distress. Patient offered wheelchair from treatment area to hospital entrance, patient declined wheelchair.

## 2021-07-01 NOTE — ED NOTES
Pt presents to ED via EMS complaining of a drug overdose. Per EMS, Pt was found unresponsive and administered 2mg of Narcan intranasally. Pt became more responsive after narcan. Pt reports she snorted heroin today as well as she took her prescription Trazodone. Pt is very tearful when speaking with the RN. Pt keeps repeating \"thank you for saving my life\" and \"I'm so sorry for this\" referring to overdosing. Pt is alert and ambulatory. Pt is alert and oriented x 4, RR even and unlabored, skin is warm and dry. Assessment completed and pt updated on plan of care. Call bell in reach. Emergency Department Nursing Plan of Care       The Nursing Plan of Care is developed from the Nursing assessment and Emergency Department Attending provider initial evaluation. The plan of care may be reviewed in the ED Provider note.     The Plan of Care was developed with the following considerations:   Patient / Family readiness to learn indicated by:verbalized understanding  Persons(s) to be included in education: patient  Barriers to Learning/Limitations:No    Signed     Mónica Grewal RN    6/30/2021   8:45 PM

## 2021-07-01 NOTE — ED NOTES
Verbal shift change report given to Bharath Yancey RN (oncoming nurse) by Jeannine Cervantes RN (offgoing nurse). Report included the following information SBAR, Kardex, ED Summary, Procedure Summary, MAR and Recent Results.

## 2021-07-01 NOTE — ED NOTES
Pt resting in bed and appears to be in no distress, pt awaiting discharge w/ family at pt's bedside.

## 2021-07-01 NOTE — ED PROVIDER NOTES
57-year-old female with a history of anxiety, arthritis, depression, diabetes presents via EMS after presumed opioid overdose. EMS was called to an apartment where they found patient unresponsive with pinpoint pupils, respiratory rate of 6, and oxygen saturation of 39% on room air with cyanosis of the lips. They gave her 2 mg of Narcan and administered ventilation via bag-valve mask. Patient awoke and is currently A&O x3 with normal respiration and without complaints. Specifically denies nausea, vomiting, pain. Past Medical History:   Diagnosis Date    Anxiety 2019    Arthritis     Depression     Diabetes ([address]     Tobacco abuse 10/22/2014       Past Surgical History:   Procedure Laterality Date    HX CHOLECYSTECTOMY  1992-[facility]         Family History:   Problem Relation Age of Onset    Hypertension Mother     Alcohol abuse Father        Social History     Socioeconomic History    Marital status: SINGLE     Spouse name: Not on file    Number of children: Not on file    Years of education: Not on file    Highest education level: Not on file   Occupational History    Not on file   Tobacco Use    Smoking status: Current Some Day Smoker     Packs/day: 0.50    Smokeless tobacco: Never Used   Substance and Sexual Activity    Alcohol use: No    Drug use: Yes     Types: Cocaine, Marijuana     Comment: 3 days    Sexual activity: Yes     Partners: Male     Birth control/protection: None   Other Topics Concern    Not on file   Social History Narrative    10/22/14 Lives with boyfriend. Has GED. Reads well. Not working, used to do [employer]. Social Determinants of Health     Financial Resource Strain:     Difficulty of Paying Living Expenses:    Food Insecurity:     Worried About Running Out of Food in the Last Year:     [address] in the Last Year:    Transportation Needs:     Lack of Transportation (Medical):      Lack of Transportation (Non-Medical):    Physical Activity:     Days of Exercise per Week:     Minutes of Exercise per Session:    Stress:     Feeling of Stress :    Social Connections:     Frequency of Communication with Friends and Family:     Frequency of Social Gatherings with Friends and Family:     Attends Sabianism Services:     Active Member of Clubs or Organizations:     Attends Club or Organization Meetings:     Marital Status:    Intimate Partner Violence:     Fear of Current or Ex-Partner:     Emotionally Abused:     Physically Abused:     Sexually Abused: ALLERGIES: Bee venom protein (honey bee)    Review of Systems   Constitutional: Positive for activity change. Negative for chills, fever and unexpected weight change. HENT: Negative. Negative for congestion and trouble swallowing. Eyes: Negative for discharge. Respiratory: Negative. Negative for cough, chest tightness and shortness of breath. Cardiovascular: Negative. Negative for chest pain. Gastrointestinal: Negative. Negative for abdominal distention, abdominal pain, constipation, diarrhea and nausea. Endocrine: Negative. Genitourinary: Negative. Negative for difficulty urinating, dysuria, frequency and urgency. Musculoskeletal: Negative. Negative for arthralgias and myalgias. Skin: Positive for color change. Allergic/Immunologic: Negative. Neurological: Negative. Negative for dizziness, speech difficulty and headaches. Hematological: Negative. Psychiatric/Behavioral: Negative. Negative for agitation and confusion. All other systems reviewed and are negative. Vitals:    06/30/21 2008   BP: (!) 156/67   Pulse: 96   Resp: 16   SpO2: 92%            Physical Exam  Vitals and nursing note reviewed. Constitutional:       Appearance: She is well-developed. HENT:      Head: Normocephalic and atraumatic. Eyes:      Conjunctiva/sclera: Conjunctivae normal.   Cardiovascular:      Rate and Rhythm: Normal rate and regular rhythm. Pulmonary:      Effort: Pulmonary effort is normal. No respiratory distress. Abdominal:      Palpations: Abdomen is soft. Tenderness: There is no abdominal tenderness. Musculoskeletal:         General: No deformity. Normal range of motion. Cervical back: Neck supple. Skin:     General: Skin is warm and dry. Neurological:      Mental Status: She is alert and oriented to person, place, and time. Psychiatric:         Mood and Affect: Affect is tearful. Behavior: Behavior normal.         Thought Content: Thought content normal.      Comments: Tearful and upset about her unintentional OD          MDM  Number of Diagnoses or Management Options  Diagnosis management comments: Narcan od. Will observe patient until 11:30PM.    ED Course as of Jul 01 0605 Wed Jun 30, 2021   8067 Patient has been in our ED over than 3 hours and has had no signs of opioid toxicity. She is stating that she is ready to go home    [SS]      ED Course User Index  [SS] Medina Elizondo MD       Procedures      LABORATORY TESTS:  Recent Results (from the past 12 hour(s))   ETHYL ALCOHOL    Collection Time: 06/30/21  8:19 PM   Result Value Ref Range    ALCOHOL(ETHYL),SERUM <10 <10 MG/DL   SAMPLES BEING HELD    Collection Time: 06/30/21  8:19 PM   Result Value Ref Range    SAMPLES BEING HELD LAV,PST     COMMENT        Add-on orders for these samples will be processed based on acceptable specimen integrity and analyte stability, which may vary by analyte.    DRUG SCREEN, URINE    Collection Time: 06/30/21  9:32 PM   Result Value Ref Range    AMPHETAMINES Negative NEG      BARBITURATES Negative NEG      BENZODIAZEPINES Negative NEG      COCAINE Positive (A) NEG      METHADONE Negative NEG      OPIATES Negative NEG      PCP(PHENCYCLIDINE) Negative NEG      THC (TH-CANNABINOL) Positive (A) NEG      Drug screen comment (NOTE)        IMAGING RESULTS:  No orders to display       MEDICATIONS GIVEN:  Medications   ondansetron [facility]) injection 4 mg (4 mg IntraVENous Given 6/30/21 2025)       IMPRESSION:  1. Opioid overdose, accidental or unintentional, initial encounter ([facility] [address]        PLAN:  1. Discharge Medication List as of 6/30/2021 11:19 PM      START taking these medications    Details   naloxone (Narcan) 4 mg/actuation nasal spray Use 1 spray intranasally, then discard. Repeat with new spray every 2 min as needed for opioid overdose symptoms, alternating nostrils. , Normal, Disp-2 Each, R-2         CONTINUE these medications which have NOT CHANGED    Details   traZODone (DESYREL) 50 mg tablet Take 50 mg by mouth nightly., Historical Med      clotrimazole (MYCELEX) 1 % vaginal cream Insert 1 Applicator into vagina nightly. For 2 weeks, Normal, Disp-45 g, R-0      fluconazole (DIFLUCAN) 150 mg tablet Take 1 tablet by mouth July 9 and take second tablet by mouth July 12, Normal, Disp-2 Tab, R-0      lidocaine 4 % patch Apply 1 patch for 12 hours. No more than 1 patch in 24 hours. , Normal, Disp-10 Patch, R-0      aspirin delayed-release 81 mg tablet Take  by mouth daily. , Historical Med      diclofenac (VOLTAREN) 1 % gel Apply  to affected area four (4) times daily. , Normal, Disp-100 g, R-3      Comp Stocking,Knee,Long,Medium misc 1 Each by Does Not Apply route daily. , Normal, Disp-1 Each, R-0      metFORMIN (GLUCOPHAGE) 1,000 mg tablet Take 1 Tab by mouth two (2) times daily (with meals). , Normal, Disp-180 Tab, R-0      FLUoxetine (PROZAC) 20 mg capsule Take 20 mg by mouth daily. , Historical Med           2.    Follow-up Information    None       Return to ED if worse

## 2023-05-14 RX ORDER — FLUOXETINE HYDROCHLORIDE 20 MG/1
20 CAPSULE ORAL DAILY
COMMUNITY

## 2023-05-14 RX ORDER — LIDOCAINE 4 G/G
PATCH TOPICAL
COMMUNITY
Start: 2019-09-24

## 2023-05-14 RX ORDER — NALOXONE HYDROCHLORIDE 4 MG/.1ML
SPRAY NASAL
COMMUNITY
Start: 2021-06-30

## 2023-05-14 RX ORDER — FLUCONAZOLE 150 MG/1
TABLET ORAL
COMMUNITY
Start: 2020-07-09

## 2023-05-14 RX ORDER — ASPIRIN 81 MG/1
TABLET ORAL DAILY
COMMUNITY

## 2023-05-14 RX ORDER — CLOTRIMAZOLE 1 %
1 CREAM WITH APPLICATOR VAGINAL
COMMUNITY
Start: 2020-07-06

## 2023-05-14 RX ORDER — TRAZODONE HYDROCHLORIDE 50 MG/1
50 TABLET ORAL NIGHTLY
COMMUNITY

## 2023-11-30 ENCOUNTER — HOSPITAL ENCOUNTER (EMERGENCY)
Facility: HOSPITAL | Age: 58
Discharge: HOME | End: 2023-11-30
Attending: STUDENT IN AN ORGANIZED HEALTH CARE EDUCATION/TRAINING PROGRAM
Payer: MEDICAID

## 2023-11-30 VITALS
DIASTOLIC BLOOD PRESSURE: 62 MMHG | OXYGEN SATURATION: 98 % | HEART RATE: 73 BPM | WEIGHT: 190 LBS | RESPIRATION RATE: 16 BRPM | BODY MASS INDEX: 29.82 KG/M2 | SYSTOLIC BLOOD PRESSURE: 154 MMHG | TEMPERATURE: 97.7 F | HEIGHT: 67 IN

## 2023-11-30 DIAGNOSIS — B34.9 VIRAL SYNDROME: Primary | ICD-10-CM

## 2023-11-30 LAB
FLUAV RNA SPEC QL NAA+PROBE: NOT DETECTED
FLUBV RNA SPEC QL NAA+PROBE: NOT DETECTED
SARS-COV-2 RNA RESP QL NAA+PROBE: DETECTED

## 2023-11-30 PROCEDURE — 87636 SARSCOV2 & INF A&B AMP PRB: CPT

## 2023-11-30 PROCEDURE — 99283 EMERGENCY DEPT VISIT LOW MDM: CPT

## 2023-11-30 RX ORDER — ONDANSETRON 4 MG/1
4 TABLET, ORALLY DISINTEGRATING ORAL 3 TIMES DAILY PRN
Qty: 21 TABLET | Refills: 0 | Status: SHIPPED | OUTPATIENT
Start: 2023-11-30

## 2023-11-30 ASSESSMENT — PAIN DESCRIPTION - PAIN TYPE: TYPE: ACUTE PAIN

## 2023-11-30 ASSESSMENT — PAIN DESCRIPTION - ORIENTATION: ORIENTATION: LEFT;RIGHT

## 2023-11-30 ASSESSMENT — PAIN - FUNCTIONAL ASSESSMENT: PAIN_FUNCTIONAL_ASSESSMENT: 0-10

## 2023-11-30 ASSESSMENT — PAIN SCALES - GENERAL: PAINLEVEL_OUTOF10: 8

## 2023-11-30 ASSESSMENT — PAIN DESCRIPTION - DESCRIPTORS: DESCRIPTORS: SHARP

## 2023-11-30 ASSESSMENT — PAIN DESCRIPTION - LOCATION: LOCATION: LEG

## 2023-11-30 NOTE — ED NOTES
Patient (s)  given copy of dc instructions and 1 script(s). Patient (s)  verbalized understanding of instructions and script (s). Patient given a current medication reconciliation form and verbalized understanding of their medications. Patient (s) verbalized understanding of the importance of discussing medications with his or her physician or clinic they will be following up with. Patient alert and oriented and in no acute distress. Patient discharged home ambulatory with self.         Trevor Fisher RN  11/30/23 0971

## 2023-11-30 NOTE — ED PROVIDER NOTES
[facility] EMERGENCY DEPT  EMERGENCY DEPARTMENT ENCOUNTER       Pt Name: Julia Jordan  MRN: [id number]  [address] [facility] 1965  Date of evaluation: 11/30/2023  Provider: Abid Estrella MD   PCP: HINA Lynn NP  Note Started: 12:27 PM EST 11/30/23     CHIEF COMPLAINT       Chief Complaint   Patient presents with    Concern For COVID-19     Per pt reports recent exposure to covid, +generalized body aches and nausea x 1 week. HISTORY OF PRESENT ILLNESS: 1 or more elements      History From: patient, History limited by: none     Julia Jordan is a 59 y.o. female who does not endorse any previous medical history presents to the emergency department with concerns for COVID. She reports that about a week ago she started experiencing some diarrhea, loss of smell, generalized body aches. She started to feel little bit better. Yesterday one of her roommates told her that she needed to get tested because they tested positive for COVID. She reports that she has had a dry cough, occasional chills, no objective fever. She denies any chest pain or shortness of breath. She has had occasional nausea and 1 episode of vomiting. Please See MDM for Additional Details of the HPI/PMH  Nursing Notes were all reviewed and agreed with or any disagreements were addressed in the HPI. REVIEW OF SYSTEMS      Positives and Pertinent negatives as per HPI.     PAST HISTORY     Past Medical History:  Past Medical History:   Diagnosis Date    Anxiety 2019    Arthritis     Depression     Diabetes ([address])     Tobacco abuse 10/22/2014       Past Surgical History:  Past Surgical History:   Procedure Laterality Date    CHOLECYSTECTOMY  1992-[facility]       Family History:  Family History   Problem Relation Age of Onset    Alcohol Abuse Father     Hypertension Mother        Social History:  Social History     Tobacco Use    Smoking status: Some Days     Packs/day: .5     Types: Cigarettes    Smokeless tobacco: Never   Vaping